# Patient Record
Sex: FEMALE | Race: WHITE | Employment: FULL TIME | URBAN - METROPOLITAN AREA
[De-identification: names, ages, dates, MRNs, and addresses within clinical notes are randomized per-mention and may not be internally consistent; named-entity substitution may affect disease eponyms.]

---

## 2017-02-15 ENCOUNTER — HOSPITAL ENCOUNTER (EMERGENCY)
Age: 32
Discharge: HOME | End: 2017-02-15
Attending: EMERGENCY MEDICINE
Payer: SELF-PAY

## 2017-02-15 VITALS
WEIGHT: 220 LBS | SYSTOLIC BLOOD PRESSURE: 179 MMHG | RESPIRATION RATE: 20 BRPM | DIASTOLIC BLOOD PRESSURE: 100 MMHG | HEART RATE: 108 BPM | OXYGEN SATURATION: 98 % | HEIGHT: 69 IN | TEMPERATURE: 98.9 F | BODY MASS INDEX: 32.58 KG/M2

## 2017-02-15 DIAGNOSIS — N39.0 URINARY TRACT INFECTION WITHOUT HEMATURIA, SITE UNSPECIFIED: ICD-10-CM

## 2017-02-15 DIAGNOSIS — Z32.01 PREGNANCY TEST PERFORMED, PREGNANCY CONFIRMED: Primary | ICD-10-CM

## 2017-02-15 LAB
ALBUMIN SERPL BCP-MCNC: 4.1 G/DL (ref 3.5–5)
ALBUMIN/GLOB SERPL: 1.1 {RATIO} (ref 1.2–3.5)
ALP SERPL-CCNC: 84 U/L (ref 50–136)
ALT SERPL-CCNC: 28 U/L (ref 12–65)
AMORPH CRY URNS QL MICRO: ABNORMAL
ANION GAP BLD CALC-SCNC: 8 MMOL/L (ref 7–16)
AST SERPL W P-5'-P-CCNC: 12 U/L (ref 15–37)
BACTERIA URNS QL MICRO: ABNORMAL /HPF
BASOPHILS # BLD AUTO: 0 K/UL (ref 0–0.2)
BASOPHILS # BLD: 0 % (ref 0–2)
BILIRUB SERPL-MCNC: 0.4 MG/DL (ref 0.2–1.1)
BUN SERPL-MCNC: 5 MG/DL (ref 6–23)
CALCIUM SERPL-MCNC: 9.3 MG/DL (ref 8.3–10.4)
CASTS URNS QL MICRO: 0 /LPF
CHLORIDE SERPL-SCNC: 110 MMOL/L (ref 98–107)
CO2 SERPL-SCNC: 24 MMOL/L (ref 21–32)
CREAT SERPL-MCNC: 0.82 MG/DL (ref 0.6–1)
CRYSTALS URNS QL MICRO: 0 /LPF
DIFFERENTIAL METHOD BLD: ABNORMAL
EOSINOPHIL # BLD: 0 K/UL (ref 0–0.8)
EOSINOPHIL NFR BLD: 0 % (ref 0.5–7.8)
EPI CELLS #/AREA URNS HPF: ABNORMAL /HPF
ERYTHROCYTE [DISTWIDTH] IN BLOOD BY AUTOMATED COUNT: 12.2 % (ref 11.9–14.6)
GLOBULIN SER CALC-MCNC: 3.8 G/DL (ref 2.3–3.5)
GLUCOSE SERPL-MCNC: 141 MG/DL (ref 65–100)
HCG SERPL-ACNC: 98 MIU/ML (ref 0–6)
HCG UR QL: POSITIVE
HCT VFR BLD AUTO: 41.8 % (ref 35.8–46.3)
HGB BLD-MCNC: 13.6 G/DL (ref 11.7–15.4)
IMM GRANULOCYTES # BLD: 0 K/UL (ref 0–0.5)
IMM GRANULOCYTES NFR BLD AUTO: 0.1 % (ref 0–5)
LIPASE SERPL-CCNC: 115 U/L (ref 73–393)
LYMPHOCYTES # BLD AUTO: 20 % (ref 13–44)
LYMPHOCYTES # BLD: 1.6 K/UL (ref 0.5–4.6)
MCH RBC QN AUTO: 29.9 PG (ref 26.1–32.9)
MCHC RBC AUTO-ENTMCNC: 32.5 G/DL (ref 31.4–35)
MCV RBC AUTO: 91.9 FL (ref 79.6–97.8)
MONOCYTES # BLD: 0.2 K/UL (ref 0.1–1.3)
MONOCYTES NFR BLD AUTO: 2 % (ref 4–12)
MUCOUS THREADS URNS QL MICRO: ABNORMAL /LPF
NEUTS SEG # BLD: 6.1 K/UL (ref 1.7–8.2)
NEUTS SEG NFR BLD AUTO: 78 % (ref 43–78)
OTHER OBSERVATIONS,UCOM: ABNORMAL
PLATELET # BLD AUTO: 300 K/UL (ref 150–450)
PMV BLD AUTO: 11.1 FL (ref 10.8–14.1)
POTASSIUM SERPL-SCNC: 3.7 MMOL/L (ref 3.5–5.1)
PROT SERPL-MCNC: 7.9 G/DL (ref 6.3–8.2)
RBC # BLD AUTO: 4.55 M/UL (ref 4.05–5.25)
RBC #/AREA URNS HPF: ABNORMAL /HPF
SODIUM SERPL-SCNC: 142 MMOL/L (ref 136–145)
WBC # BLD AUTO: 7.9 K/UL (ref 4.3–11.1)
WBC URNS QL MICRO: ABNORMAL /HPF

## 2017-02-15 PROCEDURE — 99283 EMERGENCY DEPT VISIT LOW MDM: CPT | Performed by: EMERGENCY MEDICINE

## 2017-02-15 PROCEDURE — 81015 MICROSCOPIC EXAM OF URINE: CPT | Performed by: EMERGENCY MEDICINE

## 2017-02-15 PROCEDURE — 81025 URINE PREGNANCY TEST: CPT

## 2017-02-15 PROCEDURE — 83690 ASSAY OF LIPASE: CPT | Performed by: EMERGENCY MEDICINE

## 2017-02-15 PROCEDURE — 80053 COMPREHEN METABOLIC PANEL: CPT | Performed by: EMERGENCY MEDICINE

## 2017-02-15 PROCEDURE — 81003 URINALYSIS AUTO W/O SCOPE: CPT | Performed by: EMERGENCY MEDICINE

## 2017-02-15 PROCEDURE — 84702 CHORIONIC GONADOTROPIN TEST: CPT | Performed by: EMERGENCY MEDICINE

## 2017-02-15 PROCEDURE — 85025 COMPLETE CBC W/AUTO DIFF WBC: CPT | Performed by: EMERGENCY MEDICINE

## 2017-02-15 RX ORDER — CEPHALEXIN 500 MG/1
500 CAPSULE ORAL 4 TIMES DAILY
Qty: 28 CAP | Refills: 0 | Status: SHIPPED | OUTPATIENT
Start: 2017-02-15 | End: 2017-02-22

## 2017-02-15 NOTE — DISCHARGE INSTRUCTIONS
Urinary Tract Infection in Pregnancy: Care Instructions  Your Care Instructions    A urinary tract infection, or UTI, is an infection of the bladder and other urinary structures. Most UTIs occur in the bladder. They often cause pain or burning when you urinate. UTI is the most common bacterial infection in pregnancy. If untreated, a UTI could lead to problems such as a kidney infection or  labor. Most UTIs can be cured with antibiotics. Your doctor will prescribe an antibiotic that is safe during pregnancy. Be sure to finish your medicine so that the infection does not spread to your kidneys. Follow-up care is a key part of your treatment and safety. Be sure to make and go to all appointments, and call your doctor if you are having problems. It's also a good idea to know your test results and keep a list of the medicines you take. How can you care for yourself at home? · Take your antibiotics as directed. Do not stop taking them just because you feel better. You need to take the full course of antibiotics. · Drink extra water and other fluids for the next day or two. This will help wash out the bacteria causing the infection. If you have kidney, heart, or liver disease and have to limit fluids, talk with your doctor before you increase the amount of fluids you drink. · Drink cranberry juice to help fight bacteria in the bladder. · Do not drink alcohol. · Urinate often. Try to empty your bladder each time. Preventing UTIs  · Drink plenty of fluids, enough so that your urine is light yellow or clear like water. This helps you urinate often, which clears bacteria from your system. If you have kidney, heart, or liver disease and have to limit fluids, talk with your doctor before you increase the amount of fluids you drink. · Drink cranberry juice. · Urinate when you first have the urge. · Urinate right after you have sex. This is the best way for women to avoid UTIs.   · When going to the bathroom, wipe from front to back to keep bacteria from entering the vagina or urethra. When should you call for help? Call your doctor now or seek immediate medical care if:  · Symptoms such as fever, chills, nausea, or vomiting get worse or appear for the first time. · You have new pain in your back just below your rib cage. This is called flank pain. · There is new blood or pus in your urine. · You have any problems with your antibiotic medicine. Watch closely for changes in your health, and be sure to contact your doctor if:  · You are not getting better after 1 day (24 hours). · You have new symptoms, such as blood in your urine. Where can you learn more? Go to http://alida-ed.info/. Enter M982 in the search box to learn more about \"Urinary Tract Infection in Pregnancy: Care Instructions. \"  Current as of: June 8, 2016  Content Version: 11.1  © 8353-8754 [employer] Care instructions adapted under license by [employer] (which disclaims liability or warranty for this information). If you have questions about a medical condition or this instruction, always ask your healthcare professional. [place] [address] any warranty or liability for your use of this information. Urinary Tract Infection in Women: Care Instructions  Your Care Instructions    A urinary tract infection, or UTI, is a general term for an infection anywhere between the kidneys and the urethra (where urine comes out). Most UTIs are bladder infections. They often cause pain or burning when you urinate. UTIs are caused by bacteria and can be cured with antibiotics. Be sure to complete your treatment so that the infection goes away. Follow-up care is a key part of your treatment and safety. Be sure to make and go to all appointments, and call your doctor if you are having problems.  It's also a good idea to know your test results and keep a list of the medicines you take.  How can you care for yourself at home? · Take your antibiotics as directed. Do not stop taking them just because you feel better. You need to take the full course of antibiotics. · Drink extra water and other fluids for the next day or two. This may help wash out the bacteria that are causing the infection. (If you have kidney, heart, or liver disease and have to limit fluids, talk with your doctor before you increase your fluid intake.)  · Avoid drinks that are carbonated or have caffeine. They can irritate the bladder. · Urinate often. Try to empty your bladder each time. · To relieve pain, take a hot bath or lay a heating pad set on low over your lower belly or genital area. Never go to sleep with a heating pad in place. To prevent UTIs  · Drink plenty of water each day. This helps you urinate often, which clears bacteria from your system. (If you have kidney, heart, or liver disease and have to limit fluids, talk with your doctor before you increase your fluid intake.)  · Consider adding cranberry juice to your diet. · Urinate when you need to. · Urinate right after you have sex. · Change sanitary pads often. · Avoid douches, bubble baths, feminine hygiene sprays, and other feminine hygiene products that have deodorants. · After going to the bathroom, wipe from front to back. When should you call for help? Call your doctor now or seek immediate medical care if:  · Symptoms such as fever, chills, nausea, or vomiting get worse or appear for the first time. · You have new pain in your back just below your rib cage. This is called flank pain. · There is new blood or pus in your urine. · You have any problems with your antibiotic medicine. Watch closely for changes in your health, and be sure to contact your doctor if:  · You are not getting better after taking an antibiotic for 2 days. · Your symptoms go away but then come back. Where can you learn more?   Go to http://alida-ed.info/. Enter Z112 in the search box to learn more about \"Urinary Tract Infection in Women: Care Instructions. \"  Current as of: August 12, 2016  Content Version: 11.1  © 5097-4908 [employer]. Care instructions adapted under license by [employer] (which disclaims liability or warranty for this information). If you have questions about a medical condition or this instruction, always ask your healthcare professional. [address] any warranty or liability for your use of this information.

## 2017-02-15 NOTE — ED PROVIDER NOTES
HPI Comments: Patient is a 76-year-old female who is concerned she might have pancreatitis. She has a friend who has pancreatitis. She is presenting with mild left upper quadrant pain for several weeks. She reports some nausea but no significant vomiting. She also mentions she has some pain when she urinates. She denies any significant vaginal bleeding. States she had chlamydia many years ago. She has  twins at home but has not been trying to get pregnant. Patient is a 34 y.o. female presenting with abdominal pain. The history is provided by the patient. No  was used. Abdominal Pain    Associated symptoms include nausea. Pertinent negatives include no fever, no diarrhea, no vomiting, no dysuria, no headaches and no back pain. Past Medical History:   Diagnosis Date    Chronic kidney disease      kidney stones    Other ill-defined conditions(799.89)      Yearly sinus       Past Surgical History:   Procedure Laterality Date    Hx gyn      Hx heent       tonsils         No family history on file. Social History     Social History    Marital status: SINGLE     Spouse name: N/A    Number of children: N/A    Years of education: N/A     Occupational History    Not on file. Social History Main Topics    Smoking status: Current Every Day Smoker     Packs/day: 0.50    Smokeless tobacco: Not on file    Alcohol use Yes      Comment: occ    Drug use: No    Sexual activity: Yes     Partners: Male     Birth control/ protection: Condom      Comment: lmp- 2 weeks ago     Other Topics Concern    Not on file     Social History Narrative         ALLERGIES: Pcn [penicillins]    Review of Systems   Constitutional: Negative for fatigue and fever. HENT: Negative for sore throat. Eyes: Negative for pain. Respiratory: Negative for cough, chest tightness and shortness of breath. Cardiovascular: Negative for leg swelling.    Gastrointestinal: Positive for abdominal pain and nausea. Negative for diarrhea and vomiting. Genitourinary: Negative for dysuria. Musculoskeletal: Negative for back pain. Neurological: Negative for syncope and headaches. Vitals:    02/15/17 1202   BP: (!) 179/100   Pulse: (!) 108   Resp: 20   Temp: 98.9 °F (37.2 °C)   SpO2: 98%   Weight: 99.8 kg (220 lb)   Height: 5' 9\" (1.753 m)            Physical Exam   Constitutional: She is oriented to person, place, and time. She appears well-developed and well-nourished. No distress. HENT:   Head: Normocephalic and atraumatic. Eyes: Conjunctivae and EOM are normal. Pupils are equal, round, and reactive to light. Neck: Normal range of motion. Neck supple. Cardiovascular: Normal rate, regular rhythm and normal heart sounds. Pulmonary/Chest: Effort normal and breath sounds normal. No respiratory distress. She has no wheezes. She has no rales. Abdominal: Soft. She exhibits no distension. There is no tenderness. There is no rebound. Patient has no pain to palpation. She says most of her pain is in the far left upper quadrant. No guarding. No rebound. Overall nonsurgical abdomen. Musculoskeletal: Normal range of motion. She exhibits no edema or tenderness. Neurological: She is alert and oriented to person, place, and time. Skin: Skin is warm and dry. No rash noted. She is not diaphoretic. Psychiatric: She has a normal mood and affect. Her behavior is normal.   Vitals reviewed. MDM  Number of Diagnoses or Management Options  Pregnancy test performed, pregnancy confirmed: new and does not require workup  Urinary tract infection without hematuria, site unspecified: new and does not require workup  Diagnosis management comments: I informed the patient that her urine demonstrated she is pregnant. We'll add a Quant. Will possibly need an ultrasound depending on Quant level. Quant extremely low. Ultrasound will be low yield at this time.   Given that the patient does not have pain in her adnexal region nor vaginal bleeding and low suspicion for possible ectopic. Patient also states that her left upper quadrant pain have been going on for several weeks before when she believes she got pregnant. I informed her to return to the emergency department if she expresses any increase in pain, bleeding or fevers. She expresses understanding. Strongly encourage her to follow up with her primary care provider. Obstetrician in the near future. Patient discharged on Keflex for urinary tract infection.        Amount and/or Complexity of Data Reviewed  Clinical lab tests: ordered and reviewed (Results for orders placed or performed during the hospital encounter of 02/15/17  -CBC WITH AUTOMATED DIFF       Result                                            Value                         Ref Range                       WBC                                               7.9                           4.3 - 11.1 K/uL                 RBC                                               4.55                          4.05 - 5.25 M/uL                HGB                                               13.6                          11.7 - 15.4 g/dL                HCT                                               41.8                          35.8 - 46.3 %                   MCV                                               91.9                          79.6 - 97.8 FL                  MCH                                               29.9                          26.1 - 32.9 PG                  MCHC                                              32.5                          31.4 - 35.0 g/dL                RDW                                               12.2                          11.9 - 14.6 %                   PLATELET                                          300                           150 - 450 K/uL                  MPV                                               11.1                          10.8 - 14.1 FL DF                                                AUTOMATED                                                     NEUTROPHILS                                       78                            43 - 78 %                       LYMPHOCYTES                                       20                            13 - 44 %                       MONOCYTES                                         2 (L)                         4.0 - 12.0 %                    EOSINOPHILS                                       0 (L)                         0.5 - 7.8 %                     BASOPHILS                                         0                             0.0 - 2.0 %                     IMMATURE GRANULOCYTES                             0.1                           0.0 - 5.0 %                     ABS. NEUTROPHILS                                  6.1                           1.7 - 8.2 K/UL                  ABS. LYMPHOCYTES                                  1.6                           0.5 - 4.6 K/UL                  ABS. MONOCYTES                                    0.2                           0.1 - 1.3 K/UL                  ABS. EOSINOPHILS                                  0.0                           0.0 - 0.8 K/UL                  ABS. BASOPHILS                                    0.0                           0.0 - 0.2 K/UL                  ABS. IMM.  GRANS.                                  0.0                           0.0 - 0.5 K/UL             -METABOLIC PANEL, COMPREHENSIVE       Result                                            Value                         Ref Range                       Sodium                                            142                           136 - 145 mmol/L                Potassium                                         3.7                           3.5 - 5.1 mmol/L                Chloride                                          110 (H)                       98 - 107 mmol/L                 CO2 24                            21 - 32 mmol/L                  Anion gap                                         8                             7 - 16 mmol/L                   Glucose                                           141 (H)                       65 - 100 mg/dL                  BUN                                               5 (L)                         6 - 23 MG/DL                    Creatinine                                        0.82                          0.6 - 1.0 MG/DL                 GFR est AA                                        >60                           >60 ml/min/1.73m2               GFR est non-AA                                    >60                           >60 ml/min/1.73m2               Calcium                                           9.3                           8.3 - 10.4 MG/DL                Bilirubin, total                                  0.4                           0.2 - 1.1 MG/DL                 ALT (SGPT)                                        28                            12 - 65 U/L                     AST (SGOT)                                        12 (L)                        15 - 37 U/L                     Alk. phosphatase                                  84                            50 - 136 U/L                    Protein, total                                    7.9                           6.3 - 8.2 g/dL                  Albumin                                           4.1                           3.5 - 5.0 g/dL                  Globulin                                          3.8 (H)                       2.3 - 3.5 g/dL                  A-G Ratio                                         1.1 (L)                       1.2 - 3.5                  -LIPASE       Result                                            Value                         Ref Range                       Lipase 115                           73 - 393 U/L               -URINE MICROSCOPIC       Result                                            Value                         Ref Range                       WBC                                               3-5                           0 /hpf                          RBC                                               5-10                          0 /hpf                          Epithelial cells                                  5-10                          0 /hpf                          Bacteria                                          1+ (H)                        0 /hpf                          Casts                                             0                             0 /lpf                          Crystals                                          0                             0 /LPF                          Amorphous Crystals                                TRACE                         0                               Mucus                                             2+ (H)                        0 /lpf                          Other observations                                RESULTS VERIFIED MANUALLY                                -TOTAL HCG, QT.        Result                                            Value                         Ref Range                       Beta HCG, QT                                      98 (H)                        0.0 - 6.0 MIU/ML           -HCG URINE, QL. - POC       Result                                            Value                         Ref Range                       Pregnancy test,urine (POC)                        POSITIVE (A)                  NEG                        )  Review and summarize past medical records: yes  Independent visualization of images, tracings, or specimens: yes    Risk of Complications, Morbidity, and/or Mortality  Presenting problems: high  Diagnostic procedures: moderate  Management options: moderate    Patient Progress  Patient progress: stable    ED Course       Procedures

## 2020-05-24 ENCOUNTER — HOSPITAL ENCOUNTER (EMERGENCY)
Age: 35
Discharge: HOME | End: 2020-05-24
Attending: EMERGENCY MEDICINE
Payer: SELF-PAY

## 2020-05-24 ENCOUNTER — APPOINTMENT (OUTPATIENT)
Dept: CT IMAGING | Age: 35
End: 2020-05-24
Attending: EMERGENCY MEDICINE
Payer: SELF-PAY

## 2020-05-24 VITALS
BODY MASS INDEX: 34.07 KG/M2 | RESPIRATION RATE: 16 BRPM | TEMPERATURE: 98.3 F | DIASTOLIC BLOOD PRESSURE: 78 MMHG | HEART RATE: 76 BPM | WEIGHT: 230 LBS | HEIGHT: 69 IN | OXYGEN SATURATION: 98 % | SYSTOLIC BLOOD PRESSURE: 132 MMHG

## 2020-05-24 DIAGNOSIS — R31.0 GROSS HEMATURIA: ICD-10-CM

## 2020-05-24 DIAGNOSIS — N20.0 KIDNEY STONES: Primary | ICD-10-CM

## 2020-05-24 LAB
ALBUMIN SERPL-MCNC: 4.3 G/DL (ref 3.5–5)
ALBUMIN/GLOB SERPL: 1.1 {RATIO} (ref 1.2–3.5)
ALP SERPL-CCNC: 74 U/L (ref 50–136)
ALT SERPL-CCNC: 20 U/L (ref 12–65)
ANION GAP SERPL CALC-SCNC: 5 MMOL/L (ref 7–16)
AST SERPL-CCNC: 14 U/L (ref 15–37)
BACTERIA URNS QL MICRO: 0 /HPF
BASOPHILS # BLD: 0.1 K/UL (ref 0–0.2)
BASOPHILS NFR BLD: 1 % (ref 0–2)
BILIRUB SERPL-MCNC: 0.3 MG/DL (ref 0.2–1.1)
BUN SERPL-MCNC: 12 MG/DL (ref 6–23)
CALCIUM SERPL-MCNC: 9.5 MG/DL (ref 8.3–10.4)
CASTS URNS QL MICRO: ABNORMAL /LPF
CHLORIDE SERPL-SCNC: 107 MMOL/L (ref 98–107)
CO2 SERPL-SCNC: 29 MMOL/L (ref 21–32)
CREAT SERPL-MCNC: 0.66 MG/DL (ref 0.6–1)
DIFFERENTIAL METHOD BLD: NORMAL
EOSINOPHIL # BLD: 0.2 K/UL (ref 0–0.8)
EOSINOPHIL NFR BLD: 2 % (ref 0.5–7.8)
EPI CELLS #/AREA URNS HPF: ABNORMAL /HPF
ERYTHROCYTE [DISTWIDTH] IN BLOOD BY AUTOMATED COUNT: 12.5 % (ref 11.9–14.6)
GLOBULIN SER CALC-MCNC: 3.8 G/DL (ref 2.3–3.5)
GLUCOSE SERPL-MCNC: 88 MG/DL (ref 65–100)
HCG UR QL: NEGATIVE
HCT VFR BLD AUTO: 42.9 % (ref 35.8–46.3)
HGB BLD-MCNC: 13.7 G/DL (ref 11.7–15.4)
IMM GRANULOCYTES # BLD AUTO: 0 K/UL (ref 0–0.5)
IMM GRANULOCYTES NFR BLD AUTO: 0 % (ref 0–5)
LYMPHOCYTES # BLD: 3.5 K/UL (ref 0.5–4.6)
LYMPHOCYTES NFR BLD: 42 % (ref 13–44)
MCH RBC QN AUTO: 29.8 PG (ref 26.1–32.9)
MCHC RBC AUTO-ENTMCNC: 31.9 G/DL (ref 31.4–35)
MCV RBC AUTO: 93.5 FL (ref 79.6–97.8)
MONOCYTES # BLD: 0.5 K/UL (ref 0.1–1.3)
MONOCYTES NFR BLD: 6 % (ref 4–12)
NEUTS SEG # BLD: 4.2 K/UL (ref 1.7–8.2)
NEUTS SEG NFR BLD: 49 % (ref 43–78)
NRBC # BLD: 0 K/UL (ref 0–0.2)
PLATELET # BLD AUTO: 344 K/UL (ref 150–450)
PMV BLD AUTO: 10.8 FL (ref 9.4–12.3)
POTASSIUM SERPL-SCNC: 4.1 MMOL/L (ref 3.5–5.1)
PROT SERPL-MCNC: 8.1 G/DL (ref 6.3–8.2)
RBC # BLD AUTO: 4.59 M/UL (ref 4.05–5.2)
RBC #/AREA URNS HPF: >100 /HPF
SODIUM SERPL-SCNC: 141 MMOL/L (ref 136–145)
WBC # BLD AUTO: 8.4 K/UL (ref 4.3–11.1)
WBC URNS QL MICRO: ABNORMAL /HPF

## 2020-05-24 PROCEDURE — 81003 URINALYSIS AUTO W/O SCOPE: CPT

## 2020-05-24 PROCEDURE — 99284 EMERGENCY DEPT VISIT MOD MDM: CPT

## 2020-05-24 PROCEDURE — 81015 MICROSCOPIC EXAM OF URINE: CPT

## 2020-05-24 PROCEDURE — 74011250636 HC RX REV CODE- 250/636: Performed by: EMERGENCY MEDICINE

## 2020-05-24 PROCEDURE — 80053 COMPREHEN METABOLIC PANEL: CPT

## 2020-05-24 PROCEDURE — 74176 CT ABD & PELVIS W/O CONTRAST: CPT

## 2020-05-24 PROCEDURE — 85025 COMPLETE CBC W/AUTO DIFF WBC: CPT

## 2020-05-24 PROCEDURE — 96374 THER/PROPH/DIAG INJ IV PUSH: CPT

## 2020-05-24 PROCEDURE — 81025 URINE PREGNANCY TEST: CPT

## 2020-05-24 RX ORDER — MORPHINE SULFATE 4 MG/ML
4 INJECTION INTRAVENOUS
Status: DISCONTINUED | OUTPATIENT
Start: 2020-05-24 | End: 2020-05-24 | Stop reason: HOSPADM

## 2020-05-24 RX ORDER — HYDROCODONE BITARTRATE AND ACETAMINOPHEN 5; 325 MG/1; MG/1
1 TABLET ORAL
Qty: 12 TAB | Refills: 0 | Status: SHIPPED | OUTPATIENT
Start: 2020-05-24 | End: 2020-05-27

## 2020-05-24 RX ORDER — CIPROFLOXACIN 500 MG/1
500 TABLET ORAL 2 TIMES DAILY
Qty: 14 TAB | Refills: 0 | Status: SHIPPED | OUTPATIENT
Start: 2020-05-24 | End: 2020-05-31

## 2020-05-24 RX ORDER — KETOROLAC TROMETHAMINE 30 MG/ML
30 INJECTION, SOLUTION INTRAMUSCULAR; INTRAVENOUS
Status: COMPLETED | OUTPATIENT
Start: 2020-05-24 | End: 2020-05-24

## 2020-05-24 RX ADMIN — KETOROLAC TROMETHAMINE 30 MG: 30 INJECTION, SOLUTION INTRAMUSCULAR at 11:33

## 2020-05-24 NOTE — DISCHARGE INSTRUCTIONS
Patient Education        Blood in the Urine: Care Instructions  Your Care Instructions    Blood in the urine, or hematuria, may make the urine look red, brown, or pink. There may be blood every time you urinate or just from time to time. You cannot always see blood in the urine, but it will show up in a urine test.  Blood in the urine may be serious. It should always be checked by a doctor. Your doctor may recommend more tests, including an X-ray, a CT scan, or a cystoscopy (which lets a doctor look inside the urethra and bladder). Blood in the urine can be a sign of another problem. Common causes are bladder infections and kidney stones. An injury to your groin or your genital area can also cause bleeding in the urinary tract. Very hard exercise--such as running a marathon--can cause blood in the urine. Blood in the urine can also be a sign of kidney disease or cancer in the bladder or kidney. Many cases of blood in the urine are caused by a harmless condition that runs in families. This is called benign familial hematuria. It does not need any treatment. Sometimes your urine may look red or brown even though it does not contain blood. For example, not getting enough fluids (dehydration), taking certain medicines, or having a liver problem can change the color of your urine. Eating foods such as beets, rhubarb, or blackberries or foods with red food coloring can make your urine look red or pink. Follow-up care is a key part of your treatment and safety. Be sure to make and go to all appointments, and call your doctor if you are having problems. It's also a good idea to know your test results and keep a list of the medicines you take. When should you call for help? Call your doctor now or seek immediate medical care if:    · You have symptoms of a urinary infection. For example:  ? You have pus in your urine. ? You have pain in your back just below your rib cage. This is called flank pain. ?  You have a fever, chills, or body aches. ? It hurts to urinate. ? You have groin or belly pain.     · You have more blood in your urine.    Watch closely for changes in your health, and be sure to contact your doctor if:    · You have new urination problems.     · You do not get better as expected. Where can you learn more? Go to http://alida-ed.info/  Enter V741 in the search box to learn more about \"Blood in the Urine: Care Instructions. \"  Current as of: August 21, 2019Content Version: 12.4  © 8113-1607 [employer]. Care instructions adapted under license by [employer] (which disclaims liability or warranty for this information). If you have questions about a medical condition or this instruction, always ask your healthcare professional. [address] any warranty or liability for your use of this information.

## 2020-05-24 NOTE — LETTER
[facility] EMERGENCY DEPT 
[address] [facility] [address] [facility] [address] 
[phone] Work/School Note Date: 5/24/2020 To Whom It May concern: 
 
Braulio Mahan was seen and treated today in the emergency room by the following provider(s): 
Attending Provider: Shin Chicas MD. Braulio Mahan may return to work on 5/26/2020.  
 
Sincerely,

## 2020-05-24 NOTE — ED NOTES
I have reviewed discharge instructions with the patient. The spouse verbalized understanding. Patient left ED via Discharge Method: ambulatory to Home with self. Opportunity for questions and clarification provided. Patient given 2 scripts. To continue your aftercare when you leave the hospital, you may receive an automated call from our care team to check in on how you are doing. This is a free service and part of our promise to provide the best care and service to meet your aftercare needs.  If you have questions, or wish to unsubscribe from this service please call [phone]. Thank you for Choosing our [facility] Emergency Department.

## 2020-05-24 NOTE — ED PROVIDER NOTES
Presents with complaint of bilateral flank pain and hematuria. Patient states she has a history of kidney stones and usually just hydrates and it passes but she has had a lot of cramping pain and pressure in her pelvis. She has been passing a lot of blood this time. Started 2 days ago. Her last period was about 5 days ago. Does not see a urologist.    The history is provided by the patient. Blood in Urine    This is a new problem. The current episode started 2 days ago. The problem occurs every urination. The problem has not changed since onset. The quality of the pain is described as burning, stabbing and shooting. The pain is severe. There has been no fever. Associated symptoms include hematuria and abdominal pain. Pertinent negatives include no chills. The patient is not pregnant. She has tried nothing for the symptoms. Her past medical history is significant for kidney stones. Past Medical History:   Diagnosis Date    Chronic kidney disease     kidney stones    Other ill-defined conditions(799.89)     Yearly sinus       Past Surgical History:   Procedure Laterality Date    HX GYN      HX HEENT      tonsils         No family history on file.     Social History     Socioeconomic History    Marital status: SINGLE     Spouse name: Not on file    Number of children: Not on file    Years of education: Not on file    Highest education level: Not on file   Occupational History    Not on file   Social Needs    Financial resource strain: Not on file    Food insecurity     Worry: Not on file     Inability: Not on file    Transportation needs     Medical: Not on file     Non-medical: Not on file   Tobacco Use    Smoking status: Current Every Day Smoker     Packs/day: 0.50   Substance and Sexual Activity    Alcohol use: Yes     Comment: occ    Drug use: No    Sexual activity: Yes     Partners: Male     Birth control/protection: Condom     Comment: lmp- 2 weeks ago   Lifestyle    Physical activity Days per week: Not on file     Minutes per session: Not on file    Stress: Not on file   Relationships    Social connections     Talks on phone: Not on file     Gets together: Not on file     Attends Advent service: Not on file     Active member of club or organization: Not on file     Attends meetings of clubs or organizations: Not on file     Relationship status: Not on file    Intimate partner violence     Fear of current or ex partner: Not on file     Emotionally abused: Not on file     Physically abused: Not on file     Forced sexual activity: Not on file   Other Topics Concern    Not on file   Social History Narrative    Not on file         ALLERGIES: Pcn [penicillins]    Review of Systems   Constitutional: Negative for chills and fever. Cardiovascular: Negative for chest pain. Gastrointestinal: Positive for abdominal pain. Genitourinary: Positive for hematuria. Skin: Negative for rash and wound. All other systems reviewed and are negative. Vitals:    05/24/20 1019   BP: 125/81   Pulse: 85   Resp: 18   Temp: 98.3 °F (36.8 °C)   SpO2: 100%   Weight: 104.3 kg (230 lb)   Height: 5' 9\" (1.753 m)            Physical Exam  Vitals signs and nursing note reviewed. Constitutional:       General: She is not in acute distress. Appearance: She is well-developed. She is not diaphoretic. HENT:      Head: Normocephalic and atraumatic. Eyes:      General:         Right eye: No discharge. Left eye: No discharge. Conjunctiva/sclera: Conjunctivae normal.   Neck:      Musculoskeletal: Normal range of motion and neck supple. Cardiovascular:      Rate and Rhythm: Normal rate and regular rhythm. Pulmonary:      Effort: Pulmonary effort is normal. No respiratory distress. Breath sounds: Normal breath sounds. Abdominal:      General: There is no distension. Palpations: Abdomen is soft. Tenderness: There is no abdominal tenderness.    Musculoskeletal: Normal range of motion. General: Tenderness (R>L flank) present. Skin:     General: Skin is warm and dry. Capillary Refill: Capillary refill takes less than 2 seconds. Neurological:      Mental Status: She is alert and oriented to person, place, and time. Cranial Nerves: No cranial nerve deficit. Psychiatric:         Behavior: Behavior normal.          MDM  Number of Diagnoses or Management Options  Gross hematuria:   Kidney stones:   Diagnosis management comments: B stones without obstruction.   Will tx with abx and refer to urology       Amount and/or Complexity of Data Reviewed  Clinical lab tests: reviewed and ordered    Risk of Complications, Morbidity, and/or Mortality  Presenting problems: high  Diagnostic procedures: moderate  Management options: moderate    Patient Progress  Patient progress: improved         Procedures

## 2020-07-06 ENCOUNTER — APPOINTMENT (OUTPATIENT)
Dept: GENERAL RADIOLOGY | Age: 35
End: 2020-07-06
Payer: SELF-PAY

## 2020-07-06 ENCOUNTER — HOSPITAL ENCOUNTER (EMERGENCY)
Age: 35
Discharge: HOME | End: 2020-07-06
Payer: SELF-PAY

## 2020-07-06 VITALS
DIASTOLIC BLOOD PRESSURE: 69 MMHG | WEIGHT: 220 LBS | SYSTOLIC BLOOD PRESSURE: 120 MMHG | HEART RATE: 56 BPM | TEMPERATURE: 98.2 F | BODY MASS INDEX: 31.5 KG/M2 | OXYGEN SATURATION: 99 % | HEIGHT: 70 IN | RESPIRATION RATE: 68 BRPM

## 2020-07-06 DIAGNOSIS — R00.2 PALPITATIONS: Primary | ICD-10-CM

## 2020-07-06 LAB
ALBUMIN SERPL-MCNC: 3.8 G/DL (ref 3.5–5)
ALBUMIN/GLOB SERPL: 1.1 {RATIO} (ref 1.2–3.5)
ALP SERPL-CCNC: 69 U/L (ref 50–136)
ALT SERPL-CCNC: 19 U/L (ref 12–65)
ANION GAP SERPL CALC-SCNC: 5 MMOL/L (ref 7–16)
AST SERPL-CCNC: 10 U/L (ref 15–37)
ATRIAL RATE: 56 BPM
ATRIAL RATE: 64 BPM
BASOPHILS # BLD: 0.1 K/UL (ref 0–0.2)
BASOPHILS NFR BLD: 1 % (ref 0–2)
BILIRUB SERPL-MCNC: 0.1 MG/DL (ref 0.2–1.1)
BUN SERPL-MCNC: 14 MG/DL (ref 6–23)
CALCIUM SERPL-MCNC: 9.2 MG/DL (ref 8.3–10.4)
CALCULATED P AXIS, ECG09: 39 DEGREES
CALCULATED P AXIS, ECG09: 49 DEGREES
CALCULATED R AXIS, ECG10: 32 DEGREES
CALCULATED R AXIS, ECG10: 44 DEGREES
CALCULATED T AXIS, ECG11: 36 DEGREES
CALCULATED T AXIS, ECG11: 43 DEGREES
CHLORIDE SERPL-SCNC: 105 MMOL/L (ref 98–107)
CO2 SERPL-SCNC: 29 MMOL/L (ref 21–32)
CREAT SERPL-MCNC: 0.6 MG/DL (ref 0.6–1)
D DIMER PPP FEU-MCNC: <0.27 UG/ML(FEU)
DIAGNOSIS, 93000: NORMAL
DIAGNOSIS, 93000: NORMAL
DIFFERENTIAL METHOD BLD: ABNORMAL
EOSINOPHIL # BLD: 0.1 K/UL (ref 0–0.8)
EOSINOPHIL NFR BLD: 2 % (ref 0.5–7.8)
ERYTHROCYTE [DISTWIDTH] IN BLOOD BY AUTOMATED COUNT: 12.3 % (ref 11.9–14.6)
GLOBULIN SER CALC-MCNC: 3.4 G/DL (ref 2.3–3.5)
GLUCOSE SERPL-MCNC: 91 MG/DL (ref 65–100)
HCT VFR BLD AUTO: 38.6 % (ref 35.8–46.3)
HGB BLD-MCNC: 12.4 G/DL (ref 11.7–15.4)
IMM GRANULOCYTES # BLD AUTO: 0 K/UL (ref 0–0.5)
IMM GRANULOCYTES NFR BLD AUTO: 1 % (ref 0–5)
LYMPHOCYTES # BLD: 3.9 K/UL (ref 0.5–4.6)
LYMPHOCYTES NFR BLD: 51 % (ref 13–44)
MCH RBC QN AUTO: 29.9 PG (ref 26.1–32.9)
MCHC RBC AUTO-ENTMCNC: 32.1 G/DL (ref 31.4–35)
MCV RBC AUTO: 93 FL (ref 79.6–97.8)
MONOCYTES # BLD: 0.5 K/UL (ref 0.1–1.3)
MONOCYTES NFR BLD: 7 % (ref 4–12)
NEUTS SEG # BLD: 3.1 K/UL (ref 1.7–8.2)
NEUTS SEG NFR BLD: 40 % (ref 43–78)
NRBC # BLD: 0 K/UL (ref 0–0.2)
P-R INTERVAL, ECG05: 152 MS
P-R INTERVAL, ECG05: 160 MS
PLATELET # BLD AUTO: 263 K/UL (ref 150–450)
PMV BLD AUTO: 11 FL (ref 9.4–12.3)
POTASSIUM SERPL-SCNC: 3.7 MMOL/L (ref 3.5–5.1)
PROT SERPL-MCNC: 7.2 G/DL (ref 6.3–8.2)
Q-T INTERVAL, ECG07: 416 MS
Q-T INTERVAL, ECG07: 436 MS
QRS DURATION, ECG06: 84 MS
QRS DURATION, ECG06: 86 MS
QTC CALCULATION (BEZET), ECG08: 420 MS
QTC CALCULATION (BEZET), ECG08: 429 MS
RBC # BLD AUTO: 4.15 M/UL (ref 4.05–5.2)
SODIUM SERPL-SCNC: 139 MMOL/L (ref 136–145)
TROPONIN-HIGH SENSITIVITY: 5 PG/ML (ref 0–14)
TROPONIN-HIGH SENSITIVITY: 5 PG/ML (ref 0–14)
VENTRICULAR RATE, ECG03: 56 BPM
VENTRICULAR RATE, ECG03: 64 BPM
WBC # BLD AUTO: 7.8 K/UL (ref 4.3–11.1)

## 2020-07-06 PROCEDURE — 84484 ASSAY OF TROPONIN QUANT: CPT

## 2020-07-06 PROCEDURE — 93005 ELECTROCARDIOGRAM TRACING: CPT

## 2020-07-06 PROCEDURE — 85025 COMPLETE CBC W/AUTO DIFF WBC: CPT

## 2020-07-06 PROCEDURE — 74011250636 HC RX REV CODE- 250/636

## 2020-07-06 PROCEDURE — 99285 EMERGENCY DEPT VISIT HI MDM: CPT

## 2020-07-06 PROCEDURE — 80053 COMPREHEN METABOLIC PANEL: CPT

## 2020-07-06 PROCEDURE — 96374 THER/PROPH/DIAG INJ IV PUSH: CPT

## 2020-07-06 PROCEDURE — 85379 FIBRIN DEGRADATION QUANT: CPT

## 2020-07-06 PROCEDURE — 71045 X-RAY EXAM CHEST 1 VIEW: CPT

## 2020-07-06 RX ORDER — KETOROLAC TROMETHAMINE 30 MG/ML
30 INJECTION, SOLUTION INTRAMUSCULAR; INTRAVENOUS
Status: COMPLETED | OUTPATIENT
Start: 2020-07-06 | End: 2020-07-06

## 2020-07-06 RX ADMIN — KETOROLAC TROMETHAMINE 30 MG: 30 INJECTION, SOLUTION INTRAMUSCULAR at 05:30

## 2020-07-06 NOTE — ED TRIAGE NOTES
Patient presents ambulatory to triage with steady gait, mask on, with chief complaint of palpitations and tight chest pain that began last night at 2200. Patient reports she has no hx of cardiac issues, parents have a fib but she has not been dx with this, she does not take blood thinners. Pt describes this chest pain as a tightness that is on the left side of her chest and radiates to her neck. Patient reports she called EMS to her house for this at 2230 but refused transport because she thought she had anxiety, then reports the pain resolved sightly and came back severely.

## 2020-07-06 NOTE — DISCHARGE INSTRUCTIONS

## 2020-07-06 NOTE — ED NOTES
I have reviewed discharge instructions with the patient. The patient verbalized understanding. Patient left ED via Discharge Method: ambulatory to Home with self via taxi cab  Opportunity for questions and clarification provided. Patient given 0 scripts. Pt in no acute distress at time of d/c        To continue your aftercare when you leave the hospital, you may receive an automated call from our care team to check in on how you are doing. This is a free service and part of our promise to provide the best care and service to meet your aftercare needs.  If you have questions, or wish to unsubscribe from this service please call [phone]. Thank you for Choosing our [facility] Emergency Department.

## 2020-07-06 NOTE — ED PROVIDER NOTES
79-year-old female complains of palpitations while at rest.  Patient states that she felt like her heart was beating erratically and fast at times. Palpitations    This is a new problem. The current episode started 1 to 2 hours ago. The problem has been resolved. The problem is associated with nothing. Associated symptoms include chest pain. Chest Pain    Associated symptoms include palpitations. Past Medical History:   Diagnosis Date    Chronic kidney disease     kidney stones    Other ill-defined conditions(799.89)     Yearly sinus       Past Surgical History:   Procedure Laterality Date    HX GYN      HX HEENT      tonsils         No family history on file.     Social History     Socioeconomic History    Marital status: SINGLE     Spouse name: Not on file    Number of children: Not on file    Years of education: Not on file    Highest education level: Not on file   Occupational History    Not on file   Social Needs    Financial resource strain: Not on file    Food insecurity     Worry: Not on file     Inability: Not on file    Transportation needs     Medical: Not on file     Non-medical: Not on file   Tobacco Use    Smoking status: Current Every Day Smoker     Packs/day: 0.50   Substance and Sexual Activity    Alcohol use: Yes     Comment: occ    Drug use: No    Sexual activity: Yes     Partners: Male     Birth control/protection: Condom     Comment: lmp- 2 weeks ago   Lifestyle    Physical activity     Days per week: Not on file     Minutes per session: Not on file    Stress: Not on file   Relationships    Social connections     Talks on phone: Not on file     Gets together: Not on file     Attends Oriental orthodox service: Not on file     Active member of club or organization: Not on file     Attends meetings of clubs or organizations: Not on file     Relationship status: Not on file    Intimate partner violence     Fear of current or ex partner: Not on file     Emotionally abused: Not on file     Physically abused: Not on file     Forced sexual activity: Not on file   Other Topics Concern    Not on file   Social History Narrative    Not on file         ALLERGIES: Pcn [penicillins]    Review of Systems   Constitutional: Negative. Negative for activity change. HENT: Negative. Eyes: Negative. Respiratory: Negative. Cardiovascular: Positive for chest pain and palpitations. Gastrointestinal: Negative. Genitourinary: Negative. Musculoskeletal: Negative. Skin: Negative. Neurological: Negative. Psychiatric/Behavioral: Negative. All other systems reviewed and are negative. Vitals:    07/06/20 0157 07/06/20 0201 07/06/20 0207 07/06/20 0236   BP: 151/88 (!) 149/100  134/86   Pulse: 70 69  63   Resp: 19 22  17   Temp:  98.2 °F (36.8 °C)     SpO2: 99% 100% 98% 100%   Weight:  99.8 kg (220 lb)     Height:  5' 10\" (1.778 m)              Physical Exam  Vitals signs and nursing note reviewed. Constitutional:       General: She is not in acute distress. Appearance: She is well-developed. She is not diaphoretic. HENT:      Head: Normocephalic and atraumatic. Right Ear: External ear normal.      Left Ear: External ear normal.      Nose: Nose normal.      Mouth/Throat:      Pharynx: No oropharyngeal exudate. Eyes:      General: No scleral icterus. Right eye: No discharge. Left eye: No discharge. Conjunctiva/sclera: Conjunctivae normal.      Pupils: Pupils are equal, round, and reactive to light. Neck:      Musculoskeletal: Normal range of motion and neck supple. Vascular: No JVD. Trachea: No tracheal deviation. Cardiovascular:      Rate and Rhythm: Normal rate and regular rhythm. Pulmonary:      Effort: Pulmonary effort is normal. No respiratory distress. Breath sounds: Normal breath sounds. No stridor. No wheezing. Chest:      Chest wall: No tenderness.    Abdominal:      General: Bowel sounds are normal. There is no distension. Palpations: Abdomen is soft. There is no mass. Tenderness: There is no abdominal tenderness. Musculoskeletal: Normal range of motion. General: No tenderness. Skin:     General: Skin is warm and dry. Coloration: Skin is not pale. Findings: No erythema or rash. Neurological:      Mental Status: She is alert and oriented to person, place, and time. Cranial Nerves: No cranial nerve deficit. Psychiatric:         Behavior: Behavior normal.         Thought Content: Thought content normal.          MDM  Number of Diagnoses or Management Options  Palpitations:   Diagnosis management comments: Patient had no further palpitations while emerged from the lab test x-rays were all normal.  A few PACs were noted intermittently no other abnormality seen. Patient does smoke she is encouraged to avoid nicotine was drinking alcohol yesterday which may had had slight cardiac toxicity effect. Refer her to cardiology for possible Holter monitor. Cardiology 400 number was contacted and demographics were relayed urgent follow-up was requested.        Amount and/or Complexity of Data Reviewed  Clinical lab tests: ordered and reviewed  Tests in the radiology section of CPT®: ordered and reviewed  Tests in the medicine section of CPT®: ordered and reviewed    Risk of Complications, Morbidity, and/or Mortality  Presenting problems: high  Diagnostic procedures: high  Management options: high           Procedures

## 2020-07-07 ENCOUNTER — HOSPITAL ENCOUNTER (EMERGENCY)
Age: 35
Discharge: HOME | End: 2020-07-07
Payer: SELF-PAY

## 2020-07-07 VITALS
DIASTOLIC BLOOD PRESSURE: 75 MMHG | HEIGHT: 70 IN | OXYGEN SATURATION: 98 % | BODY MASS INDEX: 31.64 KG/M2 | WEIGHT: 221 LBS | RESPIRATION RATE: 16 BRPM | HEART RATE: 47 BPM | TEMPERATURE: 98.3 F | SYSTOLIC BLOOD PRESSURE: 128 MMHG

## 2020-07-07 DIAGNOSIS — R00.2 PALPITATIONS: Primary | ICD-10-CM

## 2020-07-07 LAB
ANION GAP SERPL CALC-SCNC: 6 MMOL/L (ref 7–16)
ATRIAL RATE: 71 BPM
BUN SERPL-MCNC: 12 MG/DL (ref 6–23)
CALCIUM SERPL-MCNC: 9.3 MG/DL (ref 8.3–10.4)
CALCULATED P AXIS, ECG09: 44 DEGREES
CALCULATED R AXIS, ECG10: 40 DEGREES
CALCULATED T AXIS, ECG11: 43 DEGREES
CHLORIDE SERPL-SCNC: 107 MMOL/L (ref 98–107)
CO2 SERPL-SCNC: 26 MMOL/L (ref 21–32)
CREAT SERPL-MCNC: 0.66 MG/DL (ref 0.6–1)
DIAGNOSIS, 93000: NORMAL
GLUCOSE SERPL-MCNC: 87 MG/DL (ref 65–100)
MAGNESIUM SERPL-MCNC: 2.1 MG/DL (ref 1.8–2.4)
P-R INTERVAL, ECG05: 144 MS
POTASSIUM SERPL-SCNC: 4.3 MMOL/L (ref 3.5–5.1)
Q-T INTERVAL, ECG07: 410 MS
QRS DURATION, ECG06: 82 MS
QTC CALCULATION (BEZET), ECG08: 445 MS
SODIUM SERPL-SCNC: 139 MMOL/L (ref 136–145)
TROPONIN-HIGH SENSITIVITY: 3.9 PG/ML (ref 0–14)
VENTRICULAR RATE, ECG03: 71 BPM

## 2020-07-07 PROCEDURE — 84484 ASSAY OF TROPONIN QUANT: CPT

## 2020-07-07 PROCEDURE — 80048 BASIC METABOLIC PNL TOTAL CA: CPT

## 2020-07-07 PROCEDURE — 93005 ELECTROCARDIOGRAM TRACING: CPT | Performed by: EMERGENCY MEDICINE

## 2020-07-07 PROCEDURE — 83735 ASSAY OF MAGNESIUM: CPT

## 2020-07-07 PROCEDURE — 93005 ELECTROCARDIOGRAM TRACING: CPT

## 2020-07-07 RX ORDER — ALPRAZOLAM 0.5 MG/1
0.5 TABLET ORAL
Qty: 6 TAB | Refills: 0 | Status: SHIPPED | OUTPATIENT
Start: 2020-07-07 | End: 2022-04-06 | Stop reason: ALTCHOICE

## 2020-07-07 NOTE — ED TRIAGE NOTES
Arrives with face mask in place. Arrives from home via [facility] with reports palpitations, chest \"tightness\", nausea, shortness of breath. Reports pain to left side of neck. Onset just pta while sitting watching tv. Seen here last night for same complaint, discharged this AM approx 0700. Referred to cardiology however did not follow up due to lack of insurance. Denies caffeine or smoking today.  Orders received from dr Sarbjit Moreno for ekg

## 2020-07-07 NOTE — DISCHARGE INSTRUCTIONS
Patient Education        Supraventricular Tachycardia: Care Instructions  Your Care Instructions     Having supraventricular tachycardia (SVT) means that from time to time your heart beats abnormally fast. This fast rhythm is caused by changes in the electrical system of your heart. You may feel a fluttering in your chest (palpitations) and have a fast pulse. When your heart is beating fast, you may feel anxious and lightheaded, be short of breath, and feel discomfort in the chest.  Your doctor may prescribe medicines to help slow down your heartbeat. Your doctor may also suggest you try vagal maneuvers when having an episode of SVT. These are things, like bearing down, that might help slow your heart rate. Bearing down means that you try to breathe out with your stomach muscles but you don't let air out of your nose or mouth. Your doctor can show you how to do vagal maneuvers. He or she may suggest you lie down on your back to do them. In some cases, either cardioversion treatment or a procedure called catheter ablation is done to correct SVT. Your doctor may ask you to wear a small electronic device for 1 or 2 days to monitor your heart. It is called a Holter monitor. Follow-up care is a key part of your treatment and safety. Be sure to make and go to all appointments, and call your doctor if you are having problems. It's also a good idea to know your test results and keep a list of the medicines you take. How can you care for yourself at home? · Be safe with medicines. Take your medicines exactly as prescribed. Call your doctor if you think you are having a problem with your medicine. You will get more details on the specific medicines your doctor prescribes. · If your doctor showed you how to do vagal maneuvers, try them when you have an episode. These maneuvers include bearing down or putting an ice-cold, wet towel on your face. · Monitor your condition by keeping a diary of your SVT episodes.  Bring this to your doctor appointments. ? Write down how fast or slow your heart was beating. To count your heart rate:  § Gently place 2 fingers of your hand on the inside of your other wrist, below your thumb. § Count the beats for 30 seconds. § Then, double the result to get the number of beats per minute. ? Write down if your heart rhythm was regular or irregular. ? Write down the symptoms you had.  ? Write down the time of day your symptoms occurred. ? Write down how long your symptoms lasted. ? Write down what you were doing when your symptoms started. ? Write down what may have helped your symptoms go away. · If they trigger episodes, limit or avoid alcohol or drinks with caffeine. · Do not use over-the-counter decongestants, herbal remedies, diet pills, or \"pep\" pills, which often contain stimulants. · Do not use illegal drugs, such as cocaine, ecstasy, or methamphetamine, which can speed up your heart's rhythm. · Do not smoke. Smoking can make this condition worse. If you need help quitting, talk to your doctor about stop-smoking programs and medicines. These can increase your chances of quitting for good. · Be alert for new or worsening symptoms, such as shortness of breath, pounding of your heart, or unusual tiredness. If new symptoms develop or your symptoms become worse, call your doctor. When should you call for help? [id number] anytime you think you may need emergency care. For example, call if:  · You passed out (lost consciousness). · You are short of breath. Call your doctor now or seek immediate medical care if:  · You have a fast heartbeat. · You are dizzy or lightheaded, or feel like you may faint. Watch closely for changes in your health, and be sure to contact your doctor if:  · You do not get better as expected. Where can you learn more?   Go to http://alida-ed.info/  Enter G244 in the search box to learn more about \"Supraventricular Tachycardia: Care Instructions. \"  Current as of: December 16, 2019               Content Version: 12.5  © 0305-3690 [employer]. Care instructions adapted under license by [employer] (which disclaims liability or warranty for this information). If you have questions about a medical condition or this instruction, always ask your healthcare professional. [address] any warranty or liability for your use of this information.

## 2020-07-07 NOTE — ED PROVIDER NOTES
42-year-old female complaint of palpitations. Patient was seen yesterday evening and left earliest yesterday morning less than 24 hours ago for the same complaint. She had a complete work-up including cardiac troponins d-dimer x-rays etc. she was also monitored for many hours on the monitor no abnormal arrhythmias were noted. She was sent home to follow-up with cardiology cardiology contacted her to set up a heart monitor however due to not having insurance she did not want to pay for it herself. She is going today to get Medicaid. The history is provided by the patient. Past Medical History:   Diagnosis Date    Chronic kidney disease     kidney stones    Other ill-defined conditions(799.89)     Yearly sinus       Past Surgical History:   Procedure Laterality Date    HX GYN      HX HEENT      tonsils         No family history on file.     Social History     Socioeconomic History    Marital status: SINGLE     Spouse name: Not on file    Number of children: Not on file    Years of education: Not on file    Highest education level: Not on file   Occupational History    Not on file   Social Needs    Financial resource strain: Not on file    Food insecurity     Worry: Not on file     Inability: Not on file    Transportation needs     Medical: Not on file     Non-medical: Not on file   Tobacco Use    Smoking status: Current Every Day Smoker     Packs/day: 0.50   Substance and Sexual Activity    Alcohol use: Yes     Comment: occ    Drug use: No    Sexual activity: Yes     Partners: Male     Birth control/protection: Condom     Comment: lmp- 2 weeks ago   Lifestyle    Physical activity     Days per week: Not on file     Minutes per session: Not on file    Stress: Not on file   Relationships    Social connections     Talks on phone: Not on file     Gets together: Not on file     Attends Rastafarian service: Not on file     Active member of club or organization: Not on file     Attends meetings of clubs or organizations: Not on file     Relationship status: Not on file    Intimate partner violence     Fear of current or ex partner: Not on file     Emotionally abused: Not on file     Physically abused: Not on file     Forced sexual activity: Not on file   Other Topics Concern    Not on file   Social History Narrative    Not on file         ALLERGIES: Pcn [penicillins]    Review of Systems   Constitutional: Negative. Negative for activity change. HENT: Negative. Eyes: Negative. Respiratory: Negative. Cardiovascular: Negative. Gastrointestinal: Negative. Genitourinary: Negative. Musculoskeletal: Negative. Skin: Negative. Neurological: Negative. Psychiatric/Behavioral: Negative. All other systems reviewed and are negative. Vitals:    07/07/20 0303 07/07/20 0350 07/07/20 0354 07/07/20 0403   BP: 128/75      Pulse:  (!) 48 (!) 47    Resp:       Temp:       SpO2: 97% 100% 100% 98%   Weight:       Height:                Physical Exam  Vitals signs and nursing note reviewed. Constitutional:       General: She is not in acute distress. Appearance: She is well-developed. She is not diaphoretic. HENT:      Head: Normocephalic and atraumatic. Right Ear: External ear normal.      Left Ear: External ear normal.      Nose: Nose normal.      Mouth/Throat:      Pharynx: No oropharyngeal exudate. Eyes:      General: No scleral icterus. Right eye: No discharge. Left eye: No discharge. Conjunctiva/sclera: Conjunctivae normal.      Pupils: Pupils are equal, round, and reactive to light. Neck:      Musculoskeletal: Normal range of motion and neck supple. Vascular: No JVD. Trachea: No tracheal deviation. Cardiovascular:      Rate and Rhythm: Normal rate and regular rhythm. Pulmonary:      Effort: Pulmonary effort is normal. No respiratory distress. Breath sounds: Normal breath sounds. No stridor. No wheezing.    Chest:      Chest wall: No tenderness. Abdominal:      General: Bowel sounds are normal. There is no distension. Palpations: Abdomen is soft. There is no mass. Tenderness: There is no abdominal tenderness. Musculoskeletal: Normal range of motion. General: No tenderness. Skin:     General: Skin is warm and dry. Coloration: Skin is not pale. Findings: No erythema or rash. Neurological:      Mental Status: She is alert and oriented to person, place, and time. Cranial Nerves: No cranial nerve deficit. Psychiatric:         Behavior: Behavior normal.         Thought Content: Thought content normal.          MDM  Number of Diagnoses or Management Options  Palpitations: minor  Diagnosis management comments: Several labs were checked again they remain normal patient is having her hypoxia and tachypnea tachycardia. She was monitored for over 3 hours and had no arrhythmias other than occasional PACs she will be again asked to see an outpatient for further work-up.        Amount and/or Complexity of Data Reviewed  Clinical lab tests: ordered and reviewed  Tests in the radiology section of CPT®: ordered and reviewed  Tests in the medicine section of CPT®: ordered and reviewed           Procedures

## 2020-07-07 NOTE — ED NOTES
I have reviewed discharge instructions with the patient. The patient verbalized understanding. Patient left ED via Discharge Method: ambulatory to Home with self via taxi cab . Opportunity for questions and clarification provided. Patient given 1 scripts. Pt in no acute distress at time of d/c        To continue your aftercare when you leave the hospital, you may receive an automated call from our care team to check in on how you are doing. This is a free service and part of our promise to provide the best care and service to meet your aftercare needs.  If you have questions, or wish to unsubscribe from this service please call [phone]. Thank you for Choosing our [facility] Emergency Department.

## 2022-04-06 PROBLEM — R73.09 ELEVATED GLUCOSE: Status: ACTIVE | Noted: 2022-04-06

## 2022-04-06 PROBLEM — F11.24 OPIOID DEPENDENCE WITH OPIOID-INDUCED MOOD DISORDER (HCC): Status: ACTIVE | Noted: 2022-04-06

## 2022-04-06 PROBLEM — E66.01 CLASS 2 SEVERE OBESITY DUE TO EXCESS CALORIES WITH SERIOUS COMORBIDITY AND BODY MASS INDEX (BMI) OF 37.0 TO 37.9 IN ADULT (HCC): Status: ACTIVE | Noted: 2022-04-06

## 2022-04-11 PROBLEM — R73.09 ELEVATED GLUCOSE: Status: ACTIVE | Noted: 2022-04-06

## 2022-04-11 PROBLEM — F11.24 OPIOID DEPENDENCE WITH OPIOID-INDUCED MOOD DISORDER (HCC): Status: ACTIVE | Noted: 2022-04-06

## 2022-04-11 PROBLEM — E66.01 CLASS 2 SEVERE OBESITY DUE TO EXCESS CALORIES WITH SERIOUS COMORBIDITY AND BODY MASS INDEX (BMI) OF 37.0 TO 37.9 IN ADULT (HCC): Status: ACTIVE | Noted: 2022-04-06

## 2022-04-11 PROBLEM — E66.812 CLASS 2 SEVERE OBESITY DUE TO EXCESS CALORIES WITH SERIOUS COMORBIDITY AND BODY MASS INDEX (BMI) OF 37.0 TO 37.9 IN ADULT: Status: ACTIVE | Noted: 2022-04-06

## 2022-04-20 PROBLEM — F41.9 ANXIETY: Status: ACTIVE | Noted: 2022-04-20

## 2022-04-20 PROBLEM — E78.2 MIXED HYPERLIPIDEMIA: Status: ACTIVE | Noted: 2022-04-20

## 2022-04-20 PROBLEM — E83.52 HYPERCALCEMIA: Status: ACTIVE | Noted: 2022-04-20

## 2022-04-20 PROBLEM — R79.89 LOW SERUM VITAMIN D: Status: ACTIVE | Noted: 2022-04-20

## 2022-05-24 ENCOUNTER — NURSE TRIAGE (OUTPATIENT)
Dept: OTHER | Facility: CLINIC | Age: 37
End: 2022-05-24

## 2022-05-24 NOTE — TELEPHONE ENCOUNTER
Received call from [facility] with [place]. Subjective: Caller states \"My bones hurt all the time. My left abdomen hurts and I feel bloated. \"     Current Symptoms: Nausea, left sided abdominal pain, left leg swelling, bloating, heartburn      Onset: x3 days for abdominal pain/bloating/nausea, left leg swelling     Associated Symptoms: NA     Pain Severity: 6/10; uncomfortable; intermittent    Temperature: Denies fever- has had chills     What has been tried: Suboxone, ibuprofen       LMP: Tubes are tied  Pregnant: No    Recommended disposition: Go to ED Now- Caller does not want to have to go to ED as she would feel more comfortable being seen in the office. She also states she has 3 kids and doesn't have anyone she trusts to be with them. Care advice provided, patient verbalizes understanding; denies any other questions or concerns; instructed to call back for any new or worsening symptoms. Called [facility] and spoke to [place]. When getting ready to transfer noticed caller had disconnected. Attention Provider: Thank you for allowing me to participate in the care of your patient. The patient was connected to triage in response to information provided to the E[facility]. Please do not respond through this encounter as the response is not directed to a shared pool.     Reason for Disposition   Pain lasting > 10 minutes and over 28years old and at least one cardiac risk factor    Protocols used: ABDOMINAL PAIN - UPPER-ADULT-OH

## 2022-06-06 ENCOUNTER — OFFICE VISIT (OUTPATIENT)
Dept: ENT CLINIC | Age: 37
End: 2022-06-06
Payer: COMMERCIAL

## 2022-06-06 VITALS — HEIGHT: 70 IN | RESPIRATION RATE: 18 BRPM | WEIGHT: 262 LBS | BODY MASS INDEX: 37.51 KG/M2

## 2022-06-06 DIAGNOSIS — J35.1 LINGUAL TONSIL HYPERTROPHY: Primary | ICD-10-CM

## 2022-06-06 DIAGNOSIS — E83.52 HYPERCALCEMIA: ICD-10-CM

## 2022-06-06 DIAGNOSIS — J02.9 SORE THROAT: ICD-10-CM

## 2022-06-06 PROCEDURE — 99204 OFFICE O/P NEW MOD 45 MIN: CPT | Performed by: OTOLARYNGOLOGY

## 2022-06-06 PROCEDURE — 31575 DIAGNOSTIC LARYNGOSCOPY: CPT | Performed by: OTOLARYNGOLOGY

## 2022-06-06 RX ORDER — BUPRENORPHINE HYDROCHLORIDE AND NALOXONE HYDROCHLORIDE DIHYDRATE 8; 2 MG/1; MG/1
TABLET SUBLINGUAL DAILY
COMMUNITY
Start: 2022-03-21

## 2022-06-06 ASSESSMENT — ENCOUNTER SYMPTOMS
ABDOMINAL PAIN: 0
SHORTNESS OF BREATH: 0

## 2022-06-06 NOTE — PROGRESS NOTES
Chief Complaint   Patient presents with    New Patient     Patient states that she has her tonsils removed at 14years old and that she has a growh and other kinds of spots in her month and on her tounge. HPI:  Imtiaz Quiles is a 44 y.o. female seen today in initial consultation for her throat. She p/w concern for a R throat lesion. She previously underwent tonsillectomy at age 14. About 6 mo ago, she had a mild sore throat and then noted a spot along R side of her throat which has remained present since then. It seems to have enalrged somewhat, and there is even a white spot along the superior aspect. She also reports some bumps along posterior tongue and she c/o  intermittent choking sensastion, even w/ her own saliva. She has poor dentition and has had upper dentures since she was 15. She is former smoker. Her other concern today is hypercalcemia- this was noted this past yr on routine blood work and her Ca has been as high as 10.7- she had recent PTH which was 29. She has no previous h/o thyroid or parathyroid pathology. He was recently dx w/ a kidney stones but has no CKD. She has felt very \"panicky\" lately and not herself- also having very poor sleep. She has not had any imaging studies yet. Past Medical History, Past Surgical History, Family history, Social History, and Medications were all reviewed with the patient today and updated as necessary.      Allergies   Allergen Reactions    Penicillins Rash     Patient Active Problem List   Diagnosis    Opioid dependence with opioid-induced mood disorder (HCC)    Class 2 severe obesity due to excess calories with serious comorbidity and body mass index (BMI) of 37.0 to 37.9 in adult (HCC)    Elevated glucose    Low serum vitamin D    Mixed hyperlipidemia    Hypercalcemia    Anxiety     Current Outpatient Medications   Medication Sig    buprenorphine-naloxone (SUBOXONE) 8-2 MG SUBL SL tablet     amLODIPine (NORVASC) 10 MG tablet Take 10 mg by mouth daily    citalopram (CELEXA) 20 MG tablet Take 20 mg by mouth daily     No current facility-administered medications for this visit. Past Medical History:   Diagnosis Date    Anxiety     panic attacks    Calculus of kidney     Chronic kidney disease     kidney stones    Hypertension     Other ill-defined conditions(799.89)     Yearly sinus     Social History     Tobacco Use    Smoking status: Current Every Day Smoker     Packs/day: 0.50    Smokeless tobacco: Never Used   Substance Use Topics    Alcohol use: Yes     Past Surgical History:   Procedure Laterality Date     SECTION      x2    HEENT      tonsils     Family History   Problem Relation Age of Onset    Hypertension Father     Heart Attack Father         x4    Neuropathy Father         severe     Atrial Fibrillation Father     Other Sister         hysterectomy, appendix removal     Heart Disease Mother     Other Sister         appendix removal    Hypertension Mother     Other Mother         carpal tunnel, nerve damage    Asthma Sister         ROS:    Review of Systems   Constitutional: Negative for fever. Eyes: Negative for visual disturbance. Respiratory: Negative for shortness of breath. Cardiovascular: Negative for chest pain. Gastrointestinal: Negative for abdominal pain. Skin: Negative for rash. Neurological: Negative for dizziness and headaches. Hematological: Negative for adenopathy. Psychiatric/Behavioral: Negative for agitation. PHYSICAL EXAM:    Resp 18   Ht 5' 10\" (1.778 m)   Wt 262 lb (118.8 kg)   BMI 37.59 kg/m²     General: NAD, well-appearing  Neuro: No gross neuro deficits. CN's II-XII intact. No facial weakness. Eyes: EOMI. Pupils reactive. No periorbital edema/ecchymosis. No nystagmus. Skin: No facial erythema, rashes or concerning lesions. Nose: No external deviations or saddling.  Intranasally, septum is midline without perforations, nasal mucosa appears healthy with no erythema, mucopurulence, or polyps. Mouth: Moist mucus membranes, normal tongue/palate mobility, no concerning mucosal lesions. Oropharynx reveals healed tonsillar fossa bilaterally w/ mild scarring and no residual tonsillar tissue. There is a mass seen along BOT mainly on R side. There are prominent but otherwise normal appearing circumvallate papillae. Ears: Normal appearing auricles, no hematomas. EACs clear with no cerumen impaction, healthy canal skin, TM's intact with no perforations or retraction pockets. No middle ear effusions. Neck: Soft, supple, no palpable lateral neck masses. No palpable parotid or submandibular masses. No thyromegaly or palpable thyroid nodules. No surgical scars. Lymphatics: No palpable cervical LAD. Resp: No audible stridor or wheezing. CV: No murmurs, no JVD. Extremities: No clubbing or cyanosis. PROCEDURE: Flexible laryngoscopy  INDICATIONS: Pharyngeal mass  DESCRIPTION: After verbal consent was obtained and a timeout was performed, the flexible scope was advanced down one of the patient's nares into the nasopharynx. The nasal cavity and nasopharynx were clear. The scope was then turned distally down towards the oropharynx. There was prominent lingual tonsillar tissue bilaterally- larger on R side. The vallecula was clear and the epiglottis was normal in appearance. Both aryepiglottic folds were clear and there was a normal appearing glottis w/ healthy TVCs. No nodules or polyps and the cords were fully mobile. No concerning lesions seen along post-cricoid region or within either piriform sinus. The posterior pharyngeal was clear as well. The scope was then carefully removed. The patient tolerated the procedure well and there were no complications. ASSESSMENT and PLAN      ICD-10-CM    1. Lingual tonsil hypertrophy  J35.1 LARYNGOSCOPY,FLEX FIBER,DIAGNOSTIC   2. Sore throat  J02.9 LARYNGOSCOPY,FLEX FIBER,DIAGNOSTIC   3.  Hypercalcemia  E83.52      I scoped her today and she had R > L lingual tonsillar hypertrophy but there were no concerning masses or lesions. She had prominent circumvallate papillae that otherwise looked normal in appearance. She does have hypercalcemia and may have underlying primary HPTH- her PTH was normal but she also has Vit D deficiency. I will run her labs past one of my Endocrinology colleagues before ordering parathyroid imaging studies. I will call her w/ further steps regarding her poss hyperparathyroidism.     Mahad Kearns MD  6/6/2022    Electronically signed by Mahad Kearns MD on 6/6/2022 at 6:55 PM

## 2022-06-08 ENCOUNTER — OFFICE VISIT (OUTPATIENT)
Dept: INTERNAL MEDICINE CLINIC | Facility: CLINIC | Age: 37
End: 2022-06-08
Payer: MEDICAID

## 2022-06-08 VITALS
SYSTOLIC BLOOD PRESSURE: 120 MMHG | DIASTOLIC BLOOD PRESSURE: 80 MMHG | HEART RATE: 86 BPM | BODY MASS INDEX: 37.37 KG/M2 | HEIGHT: 70 IN | OXYGEN SATURATION: 97 % | WEIGHT: 261 LBS | TEMPERATURE: 97.8 F

## 2022-06-08 DIAGNOSIS — M79.89 LEG SWELLING: ICD-10-CM

## 2022-06-08 DIAGNOSIS — N20.0 RIGHT KIDNEY STONE: Primary | ICD-10-CM

## 2022-06-08 DIAGNOSIS — I10 PRIMARY HYPERTENSION: ICD-10-CM

## 2022-06-08 DIAGNOSIS — E83.52 HYPERCALCEMIA: ICD-10-CM

## 2022-06-08 PROCEDURE — 99214 OFFICE O/P EST MOD 30 MIN: CPT | Performed by: NURSE PRACTITIONER

## 2022-06-08 RX ORDER — VALSARTAN 80 MG/1
80 TABLET ORAL DAILY
Qty: 30 TABLET | Refills: 5 | Status: SHIPPED | OUTPATIENT
Start: 2022-06-08 | End: 2022-07-05

## 2022-06-08 RX ORDER — AMLODIPINE BESYLATE 5 MG/1
5 TABLET ORAL DAILY
Qty: 30 TABLET | Refills: 5 | Status: SHIPPED | OUTPATIENT
Start: 2022-06-08 | End: 2022-07-05

## 2022-06-08 ASSESSMENT — PATIENT HEALTH QUESTIONNAIRE - PHQ9
2. FEELING DOWN, DEPRESSED OR HOPELESS: 0
SUM OF ALL RESPONSES TO PHQ QUESTIONS 1-9: 0
1. LITTLE INTEREST OR PLEASURE IN DOING THINGS: 0
SUM OF ALL RESPONSES TO PHQ9 QUESTIONS 1 & 2: 0
SUM OF ALL RESPONSES TO PHQ QUESTIONS 1-9: 0

## 2022-06-08 NOTE — PROGRESS NOTES
Dominguez Calvin (:  1985) is a 44 y.o. female,Established patient, here for evaluation of the following chief complaint(s):  Follow-up (ED )         ASSESSMENT/PLAN:  1. Right kidney stone  -     [facility], [place]  2. Hypercalcemia  -     [address], [place]  3. Primary hypertension  4. Leg swelling      No follow-ups on file. Reviewed hospital lab and imaging  7mm stone  Refer to urology, hydrate well  Continued hypercalcemia, refer endocrinology for further work up, reviewed ENT note   Leg swelling on amlodipine, decrease amlodipine and add valsartan, bp recheck 2 week    Subjective   SUBJECTIVE/OBJECTIVE:  Patient is here for ER follow up. She had pain in left abdomen and went to ER . She went to [facility] ER. She is still concerned about hypercalcemia, she saw ENT and her tonsils were ok. She is concerned about the elevated calcium and would like endocrine referral.       Review of Systems       Objective   Physical Exam  Vitals reviewed. Constitutional:       Appearance: Normal appearance. She is obese. She is not ill-appearing. HENT:      Head: Normocephalic. Right Ear: External ear normal.      Left Ear: External ear normal.   Eyes:      Extraocular Movements: Extraocular movements intact. Pupils: Pupils are equal, round, and reactive to light. Cardiovascular:      Rate and Rhythm: Normal rate and regular rhythm. Pulmonary:      Effort: Pulmonary effort is normal.      Breath sounds: Normal breath sounds. Musculoskeletal:      Cervical back: Neck supple. Neurological:      General: No focal deficit present. Mental Status: She is alert and oriented to person, place, and time. Psychiatric:         Mood and Affect: Mood normal.         Behavior: Behavior normal.                  An electronic signature was used to authenticate this note.     --Cam Hendrix, APRN - CNP

## 2022-06-09 ENCOUNTER — TELEPHONE (OUTPATIENT)
Dept: INTERNAL MEDICINE CLINIC | Facility: CLINIC | Age: 37
End: 2022-06-09

## 2022-06-09 DIAGNOSIS — E21.0 PRIMARY HYPERPARATHYROIDISM (HCC): Primary | ICD-10-CM

## 2022-06-09 NOTE — TELEPHONE ENCOUNTER
She would likely fell better if her legs aren't swelling. She could do olmesartan or another ARB in the class if she would prefer.    Thanks  Milagros Maurer

## 2022-06-09 NOTE — PROGRESS NOTES
Spoke to pt about her labs- I will refer her to Endocrinology. I had spoken to their PA Mani about her case earlier this wk.

## 2022-06-09 NOTE — TELEPHONE ENCOUNTER
Patient notified, and states she will take whatever you think is best she just saw a lot of bad reviews and side effects and recall on the valsartan and did not want to throw in the issues the valsartan could cause to the mix. Please advise.

## 2022-06-10 NOTE — TELEPHONE ENCOUNTER
I would not expect her to have any side effects, and decreasing the amlodipine dose should definitely help her leg swelling.

## 2022-06-22 ENCOUNTER — OFFICE VISIT (OUTPATIENT)
Dept: UROLOGY | Age: 37
End: 2022-06-22
Payer: COMMERCIAL

## 2022-06-22 DIAGNOSIS — N20.0 KIDNEY STONE: Primary | ICD-10-CM

## 2022-06-22 LAB
BILIRUBIN, URINE, POC: NEGATIVE
BLOOD URINE, POC: NEGATIVE
GLUCOSE URINE, POC: NEGATIVE
KETONES, URINE, POC: NEGATIVE
LEUKOCYTE ESTERASE, URINE, POC: NEGATIVE
NITRITE, URINE, POC: NEGATIVE
PH, URINE, POC: 7 (ref 4.6–8)
PROTEIN,URINE, POC: NEGATIVE
SPECIFIC GRAVITY, URINE, POC: 1.01 (ref 1–1.03)
URINALYSIS CLARITY, POC: ABNORMAL
URINALYSIS COLOR, POC: ABNORMAL
UROBILINOGEN, POC: ABNORMAL

## 2022-06-22 PROCEDURE — 99203 OFFICE O/P NEW LOW 30 MIN: CPT | Performed by: NURSE PRACTITIONER

## 2022-06-22 PROCEDURE — 81003 URINALYSIS AUTO W/O SCOPE: CPT | Performed by: NURSE PRACTITIONER

## 2022-06-22 ASSESSMENT — ENCOUNTER SYMPTOMS
NAUSEA: 1
CONSTIPATION: 1
INDIGESTION: 1
HEARTBURN: 1
SHORTNESS OF BREATH: 1
BACK PAIN: 1
SKIN LESIONS: 1
VOMITING: 1

## 2022-06-22 NOTE — PROGRESS NOTES
[facility]  [address] [place] [address]    [address] [place] [address], [address]  : 1985    Chief Complaint   Patient presents with    Nephrolithiasis          HPI     Vin Bailey is a 44 y.o. female w hx of HTN and anxiety being seen today as a new pt referred by PCP for kidney stone. Presented to [address] [place] [address] on 22 w c/o abd pain, CP, and SOB. Had CT abd pelvis wo contrast completed revealing a 7 mm non obstructing RLP stone. Cr 0.72. Ca 10.7. WBC 8.1. Pt left ER before being evaluated by physician. Today, pt reports she is asx. Hx of stones beginning around . Has never had surgical intervention. KUB in office today reviewed by myself and can not visualize stone due to stool/gas pattern. Had been referred to endocrinology for hypercalcemia. Quite concerned about this. Past Medical History:   Diagnosis Date    Anxiety     panic attacks    Calculus of kidney     Chronic kidney disease     kidney stones    Hypertension     Other ill-defined conditions(799.89)     Yearly sinus     Past Surgical History:   Procedure Laterality Date     SECTION      x2    HEENT      tonsils     Current Outpatient Medications   Medication Sig Dispense Refill    amLODIPine (NORVASC) 5 MG tablet Take 1 tablet by mouth daily 30 tablet 5    valsartan (DIOVAN) 80 mg tablet Take 1 tablet by mouth daily 30 tablet 5    buprenorphine-naloxone (SUBOXONE) 8-2 MG SUBL SL tablet       citalopram (CELEXA) 10 MG tablet Take 10 mg by mouth daily        No current facility-administered medications for this visit.      Allergies   Allergen Reactions    Penicillins Rash     Social History     Socioeconomic History    Marital status: Single     Spouse name: Not on file    Number of children: Not on file    Years of education: Not on file    Highest education level: Not on file   Occupational History    Not on file   Tobacco Use    Smoking status: Current Every Day Smoker     Packs/day: 0.50    Smokeless tobacco: Never Used   Substance and Sexual Activity    Alcohol use: Yes    Drug use: No    Sexual activity: Not on file     Comment: lmp- 2 weeks ago   Other Topics Concern    Not on file   Social History Narrative    Not on file     Social Determinants of Health     Financial Resource Strain:     Difficulty of Paying Living Expenses: Not on file   Food Insecurity:     Worried About Running Out of Food in the Last Year: Not on file    Sonia of Food in the Last Year: Not on file   Transportation Needs:     Lack of Transportation (Medical): Not on file    Lack of Transportation (Non-Medical):  Not on file   Physical Activity:     Days of Exercise per Week: Not on file    Minutes of Exercise per Session: Not on file   Stress:     Feeling of Stress : Not on file   Social Connections:     Frequency of Communication with Friends and Family: Not on file    Frequency of Social Gatherings with Friends and Family: Not on file    Attends Restoration Services: Not on file    Active Member of [address] [place] [address] [employer] or Organizations: Not on file    Attends Club or Organization Meetings: Not on file    Marital Status: Not on file   Intimate Partner Violence:     Fear of Current or Ex-Partner: Not on file    Emotionally Abused: Not on file    Physically Abused: Not on file    Sexually Abused: Not on file   Housing Stability:     Unable to Pay for Housing in the Last Year: Not on file    Number of [place] in the Last Year: Not on file    Unstable Housing in the Last Year: Not on file     Family History   Problem Relation Age of Onset    Hypertension Father     Heart Attack Father         x4    Neuropathy Father         severe     Atrial Fibrillation Father     Other Sister         hysterectomy, appendix removal     Heart Disease Mother     Other Sister         appendix removal    Hypertension Mother    Connye Sole Other Mother         carpal tunnel, nerve damage  Asthma Sister        Review of Systems  Constitutional: Positive for fever, chills, appetite change, malaise/fatigue and headaches. Skin: Positive for skin lesions and rash. ENT: Positive for pain swallowing, high frequency hearing loss and dry mouth. Respiratory: Positive for shortness of breath. Cardiovascular: Positive for chest pain, hypertension, irregular heartbeat, leg pain, leg swelling, regular rate and rhythm and varicose veins. GI: Positive for nausea, vomiting, constipation, indigestion and heartburn. Genitourinary: Positive for urinary burning, hematuria, flank pain, history of urolithiasis, nocturia, straining, urgency, frequent urination, incomplete emptying and menstrual problem. Number of pregnancies: 2. Number of births: 3. Musculoskeletal: Positive for back pain, bone pain, arthralgias, tenderness, muscle weakness and neck pain. Neurological: Positive for dizziness and numbness. Endocrine: Positive for cold intolerance, thirst, excessive urination, fatigue and heat intolerance.       Urinalysis  UA - Dipstick  Results for orders placed or performed in visit on 06/22/22   AMB POC URINALYSIS DIP STICK AUTO W/O MICRO   Result Value Ref Range    Color (UA POC)      Clarity (UA POC)      Glucose, Urine, POC Negative Negative    Bilirubin, Urine, POC Negative Negative    KETONES, Urine, POC Negative Negative    Specific Gravity, Urine, POC 1.015 1.001 - 1.035    Blood (UA POC) Negative Negative    pH, Urine, POC 7.0 4.6 - 8.0    Protein, Urine, POC Negative Negative    Urobilinogen, POC 2 mg/dL     Nitrite, Urine, POC Negative Negative    Leukocyte Esterase, Urine, POC Negative Negative       PHYSICAL EXAM    General appearance - well appearing and in no distress  Mental status - alert, oriented to person, place, and time  Neck - supple, no significant adenopathy  Chest/Lung-  Quiet, even and easy respiratory effort without use of accessory muscles  Skin - normal coloration and turgor, no alexx        Assessment and Plan    ICD-10-CM    1. Kidney stone  N20.0 AMB POC URINALYSIS DIP STICK AUTO W/O MICRO     AMB POC XRAY ABDOMEN 1 VIEW   Discussed tx options for right renal stone including observation vs ESWL (w clear KUB) vs URS. Pt opts to monitor for now. I have educated pt. about diet modifications that can decrease the risk of future calcium stone formation. These include decreasing things high in oxalate such as teas and benja. Also, I have encouraged pt. to increase clear liquid intake, especially H2O. Advised the recommended water consumption is 3 liter per day. Things high in citrate such as lemon juice should also be increased. ROV in 3 months for recheck. To call sooner if needed. Willow Lei is supervising physician today and he approves plan of care.

## 2022-07-05 RX ORDER — AMLODIPINE BESYLATE 10 MG/1
10 TABLET ORAL DAILY
Qty: 30 TABLET | Refills: 4 | Status: SHIPPED | OUTPATIENT
Start: 2022-07-05

## 2022-08-03 ENCOUNTER — TELEMEDICINE (OUTPATIENT)
Dept: ENDOCRINOLOGY | Age: 37
End: 2022-08-03
Payer: COMMERCIAL

## 2022-08-03 DIAGNOSIS — E83.52 HYPERCALCEMIA: Primary | ICD-10-CM

## 2022-08-03 DIAGNOSIS — Z87.442 HISTORY OF NEPHROLITHIASIS: ICD-10-CM

## 2022-08-03 DIAGNOSIS — R79.89 LOW SERUM VITAMIN D: ICD-10-CM

## 2022-08-03 DIAGNOSIS — M89.8X9 BONE PAIN: ICD-10-CM

## 2022-08-03 PROCEDURE — 99215 OFFICE O/P EST HI 40 MIN: CPT | Performed by: PHYSICIAN ASSISTANT

## 2022-08-03 ASSESSMENT — ENCOUNTER SYMPTOMS
NAUSEA: 1
EYE REDNESS: 1
CONSTIPATION: 1

## 2022-08-03 NOTE — PROGRESS NOTES
[facility] ENDOCRINOLOGY   AND   THYROID NODULE CLINIC    Maria Dolores Benson PA-C  [address] [facility]  [address], [address]  [place], [place]  Phone [phone]  Facsimile [phone]          Mirella Wheeler is a 44 y.o. female seen 8/3/2022 at the request of Ms. Chavo Colby for the evaluation of hypercalcemia        Assessment and Plan:    Pursuant to the emergency declaration under the [facility], [facility] waiver authority and the [employer] Act, this Virtual Visit was conducted, with patient's consent, to reduce the patient's risk of exposure to COVID-19 and provide continuity of care for an established patient. Services were provided through a video synchronous discussion virtually to substitute for in-person clinic visit. Total Time: minutes: >40 .        1. Hypercalcemia  Patient with hypercalcemia which appears symptomatic in the context of recurrent nephrolithiasis presents for new patient evaluation. Previous labs show elevated calcium with inappropriately normal PTH. Patient has multiple symptoms including bones, stones, psychiatric overtones and bone pain as below. Obtain labs including 24-hour urine calcium.  - TSH; Future  - T4, Free; Future  - Phosphorus; Future  - Magnesium; Future  - PTH, Intact; Future  - Calcium, Ionized; Future  - Vitamin D 25 Hydroxy; Future  - Comprehensive Metabolic Panel; Future  - Electrophoresis Protein, Serum; Future  - PTH-Related Peptide; Future  - Vitamin D 1,25 Dihydroxy; Future  - Calcium, 24 HR Urine; Future  - Creatinine, 24 HR Urine; Future    2. Low serum vitamin D  Slightly low vitamin D in the context of hypercalcemia, not currently on therapy. Careful repletion should be considered due to HX of nephrolithasis   - Vitamin D 25 Hydroxy; Future  - Vitamin D 1,25 Dihydroxy;  Future  - Calcium, 24 HR Urine; Future  - Creatinine, 24 HR Urine; Future    3. Bone pain    - TSH; Future  - T4, Free; Future  - Phosphorus; Future  - Magnesium; Future  - PTH, Intact; Future  - Calcium, Ionized; Future  - Vitamin D 25 Hydroxy; Future  - Comprehensive Metabolic Panel; Future  - Electrophoresis Protein, Serum; Future  - PTH-Related Peptide; Future  - Vitamin D 1,25 Dihydroxy; Future  - Calcium, 24 HR Urine; Future  - Creatinine, 24 HR Urine; Future    4. History of nephrolithiasis  Under care of urology  - TSH; Future  - T4, Free; Future  - Phosphorus; Future  - Magnesium; Future  - PTH, Intact; Future  - Calcium, Ionized; Future  - Vitamin D 25 Hydroxy; Future  - Comprehensive Metabolic Panel; Future  - Electrophoresis Protein, Serum; Future  - PTH-Related Peptide; Future  - Vitamin D 1,25 Dihydroxy; Future  - Calcium, 24 HR Urine; Future  - Creatinine, 24 HR Urine; Future      [facility] for [facility] patient. Diagnoses and all orders for this visit:    Hypercalcemia  -     TSH; Future  -     T4, Free; Future  -     Phosphorus; Future  -     Magnesium; Future  -     PTH, Intact; Future  -     Calcium, Ionized; Future  -     Vitamin D 25 Hydroxy; Future  -     Comprehensive Metabolic Panel; Future  -     Electrophoresis Protein, Serum; Future  -     PTH-Related Peptide; Future  -     Vitamin D 1,25 Dihydroxy; Future  -     Calcium, 24 HR Urine; Future  -     Creatinine, 24 HR Urine; Future    Low serum vitamin D  -     Vitamin D 25 Hydroxy; Future  -     Vitamin D 1,25 Dihydroxy; Future  -     Calcium, 24 HR Urine; Future  -     Creatinine, 24 HR Urine; Future    Bone pain  -     TSH; Future  -     T4, Free; Future  -     Phosphorus; Future  -     Magnesium; Future  -     PTH, Intact; Future  -     Calcium, Ionized; Future  -     Vitamin D 25 Hydroxy; Future  -     Comprehensive Metabolic Panel; Future  -     Electrophoresis Protein, Serum; Future  -     PTH-Related Peptide;  Future  -     Vitamin D 1,25 Dihydroxy; Future  -     Calcium, 24 HR Urine; Future  -     Creatinine, 24 HR Urine; Future    History of nephrolithiasis  -     TSH; Future  -     T4, Free; Future  -     Phosphorus; Future  -     Magnesium; Future  -     PTH, Intact; Future  -     Calcium, Ionized; Future  -     Vitamin D 25 Hydroxy; Future  -     Comprehensive Metabolic Panel; Future  -     Electrophoresis Protein, Serum; Future  -     PTH-Related Peptide; Future  -     Vitamin D 1,25 Dihydroxy; Future  -     Calcium, 24 HR Urine; Future  -     Creatinine, 24 HR Urine; Future          History of Present Illness:    8/3/2022  VIRTUAL VISIT  Po Laguerre is a 44 y.o. female who was seen by synchronous (real-time) audio-video technology on 8/3/2022 . The patient provided consent for this audio-video interaction. The [employer] platform was used. Patient was located at their home and provider in the office. HYPERCALCEMIA      Presentation/Diagnosis:    Noted hypercalcemia since aug 2021    Risk Factors:  Denies the use of calcium, vitamin D, thiazides, lithium. Stopped multivitamin    No family history of hypercalcemia, hyperparathyroidism, multiple endocrine neoplasia. Symptoms:     history of recurrent kidney stones for past 10 years    Denies hematuria    C/o: Fatigue, constipation, abdominal pain with nausea and bloating, constant bone and joint pain, myalgias  memory problems x 1 year. polyuria, polydipsia.       Intermittent and mild right flank pain    Denies depression but has severe and worsening anxiety (improved on citalopram)     Dry skin, hair falling out, eyes dry and crusty and red with some burning, blurred vision    Left neck spasm       Imaging:      Fracture History:  none    Treatment:    Contraindications to treatment:        Pertinent Labs:    Calcium  04/20/2022 10.3  05/26/2022 10.7    Vitamin D  04/06/2022 26.1    PTH  04/20/2022 29 (inappropriately normal) with calcium 10.3    Phosphorus    Magnesium  04/20/2022 2.0. SPEP    Thyroid  04/06/2022 1.350    Renal function    05/26/2022 Cr 0.72, GFR >90        Allergies & Medications:  Reviewed in chart. Review of Systems   Constitutional:  Positive for fatigue. Eyes:  Positive for redness and visual disturbance. Cardiovascular:  Positive for leg swelling (left>right). Gastrointestinal:  Positive for constipation and nausea. ABD BLOATING   Endocrine: Positive for polydipsia and polyuria. Negative for polyphagia. Musculoskeletal:  Positive for arthralgias and myalgias. Psychiatric/Behavioral:  The patient is nervous/anxious. Vital Signs: There were no vitals taken for this visit. Return in about 3 months (around 11/3/2022), or NF labs soon. Portions of this note were generated with the assistance of voice recogniton software. As such, some errors in transcription may be present.

## 2022-08-15 DIAGNOSIS — Z87.442 HISTORY OF NEPHROLITHIASIS: ICD-10-CM

## 2022-08-15 DIAGNOSIS — R79.89 LOW SERUM VITAMIN D: ICD-10-CM

## 2022-08-15 DIAGNOSIS — E83.52 HYPERCALCEMIA: ICD-10-CM

## 2022-08-15 DIAGNOSIS — M89.8X9 BONE PAIN: ICD-10-CM

## 2022-08-15 LAB
COLLECT DURATION TIME UR: 24 HR
CREAT 24H UR-MRATE: 777 MG/24 HR (ref 600–1800)
CREAT UR-MCNC: 42 MG/DL
SPECIMEN VOL ?TM UR: 1850 ML

## 2022-08-16 ENCOUNTER — TELEPHONE (OUTPATIENT)
Dept: ENDOCRINOLOGY | Age: 37
End: 2022-08-16

## 2022-08-16 LAB
CALCIUM 24H UR-MCNC: 5.7 MG/DL
CALCIUM 24H UR-MRATE: 105 MG/24 HR (ref 0–320)
COLLECT DURATION TIME UR: 24 HR
SPECIMEN VOL ?TM UR: 1850 ML

## 2022-08-17 NOTE — TELEPHONE ENCOUNTER
Patient given lab results. Patient advised per provider to take a low dose Vitamin D3 supplement. Patient verbalized understanding but also had concern about her symptoms and labs that were out of range. Patient stated that she doesn't understand how she can feel bad but her labs don't show anything.

## 2022-10-17 DIAGNOSIS — E83.52 HYPERCALCEMIA: ICD-10-CM

## 2022-10-17 DIAGNOSIS — I10 PRIMARY HYPERTENSION: Primary | ICD-10-CM

## 2022-10-17 DIAGNOSIS — R79.89 LOW VITAMIN D LEVEL: ICD-10-CM

## 2022-10-26 ENCOUNTER — OFFICE VISIT (OUTPATIENT)
Dept: UROLOGY | Age: 37
End: 2022-10-26
Payer: MEDICAID

## 2022-10-26 DIAGNOSIS — N20.0 KIDNEY STONE: Primary | ICD-10-CM

## 2022-10-26 LAB
BILIRUBIN, URINE, POC: NEGATIVE
BLOOD URINE, POC: NEGATIVE
GLUCOSE URINE, POC: NEGATIVE
KETONES, URINE, POC: NEGATIVE
LEUKOCYTE ESTERASE, URINE, POC: NEGATIVE
NITRITE, URINE, POC: NEGATIVE
PH, URINE, POC: 6 (ref 4.6–8)
PROTEIN,URINE, POC: NEGATIVE
SPECIFIC GRAVITY, URINE, POC: 1.01 (ref 1–1.03)
URINALYSIS CLARITY, POC: NORMAL
URINALYSIS COLOR, POC: NORMAL
UROBILINOGEN, POC: NORMAL

## 2022-10-26 PROCEDURE — 99214 OFFICE O/P EST MOD 30 MIN: CPT | Performed by: NURSE PRACTITIONER

## 2022-10-26 PROCEDURE — 81003 URINALYSIS AUTO W/O SCOPE: CPT | Performed by: NURSE PRACTITIONER

## 2022-10-26 ASSESSMENT — ENCOUNTER SYMPTOMS
RESPIRATORY NEGATIVE: 1
EYES NEGATIVE: 1

## 2022-10-26 NOTE — H&P (VIEW-ONLY)
[facility]  [address] [place] [address]    [address] [place] [address], [address]  : 1985    Chief Complaint   Patient presents with    Nephrolithiasis          HPI     Sheri Doran is a 44 y.o. female w hx of HTN and anxiety being seen today as a new pt referred by PCP for kidney stone. Presented to [address] [place] [address] on 22 w c/o abd pain, CP, and SOB. Had CT abd pelvis wo contrast completed revealing a 7 mm non obstructing RLP stone. Cr 0.72. Ca 10.7. WBC 8.1. Pt left ER before being evaluated by physician. Today, pt reports worsened amina flank pain R>L. Afebrile. Hx of stones beginning around . Has never had surgical intervention. KUB in office today reviewed by myself and shows 5-6 mm stone to RLP. Had been referred to endocrinology for hypercalcemia. Ca was normal w last blood draw. Cr 0.60. No BT. Past Medical History:   Diagnosis Date    Anxiety     panic attacks    Calculus of kidney     Chronic kidney disease     kidney stones    Hypertension     Other ill-defined conditions(799.89)     Yearly sinus     Past Surgical History:   Procedure Laterality Date     SECTION      x2    HEENT      tonsils     Current Outpatient Medications   Medication Sig Dispense Refill    amLODIPine (NORVASC) 10 MG tablet Take 1 tablet by mouth daily 30 tablet 4    buprenorphine-naloxone (SUBOXONE) 8-2 MG SUBL SL tablet       citalopram (CELEXA) 10 MG tablet Take 10 mg by mouth daily        No current facility-administered medications for this visit.      Allergies   Allergen Reactions    Penicillins Rash     Social History     Socioeconomic History    Marital status: Single     Spouse name: Not on file    Number of children: Not on file    Years of education: Not on file    Highest education level: Not on file   Occupational History    Not on file   Tobacco Use    Smoking status: Every Day     Packs/day: 0.50     Types: Cigarettes    Smokeless tobacco: Never   Substance and Sexual Activity    Alcohol use: Yes    Drug use: No    Sexual activity: Not on file     Comment: lmp- 2 weeks ago   Other Topics Concern    Not on file   Social History Narrative    Not on file     Social Determinants of Health     Financial Resource Strain: Not on file   Food Insecurity: Not on file   Transportation Needs: Not on file   Physical Activity: Not on file   Stress: Not on file   Social Connections: Not on file   Intimate Partner Violence: Not on file   Housing Stability: Not on file     Family History   Problem Relation Age of Onset    Hypertension Father     Heart Attack Father         x4    Neuropathy Father         severe     Atrial Fibrillation Father     Other Sister         hysterectomy, appendix removal     Heart Disease Mother     Other Sister         appendix removal    Hypertension Mother     Other Mother         carpal tunnel, nerve damage    Asthma Sister        Review of Systems  Constitutional: Negative  Skin: Negative skin ROS  Eyes: Eyes negative  ENT: HENT negative  Respiratory: Respiratory negative  Cardiovascular: Positive for hypertension. GI: Neg GI ROS  Genitourinary: Positive for history of urolithiasis.   Musculoskeletal: Musculoskeletal negative  Neurological: Neg neuro ROS  Psychological: Neg psych ROS  Endocrine: Endocrine negative  Hem/Lymphatic: Hematologic/lymphatic negative    Urinalysis  UA - Dipstick  Results for orders placed or performed in visit on 10/26/22   AMB POC URINALYSIS DIP STICK AUTO W/O MICRO   Result Value Ref Range    Color (UA POC)      Clarity (UA POC)      Glucose, Urine, POC Negative Negative    Bilirubin, Urine, POC Negative Negative    KETONES, Urine, POC Negative Negative    Specific Gravity, Urine, POC 1.015 1.001 - 1.035    Blood (UA POC) Negative Negative    pH, Urine, POC 6.0 4.6 - 8.0    Protein, Urine, POC Negative Negative    Urobilinogen, POC Normal     Nitrite, Urine, POC Negative Negative    Leukocyte Esterase, Urine, POC Negative Negative       PHYSICAL EXAM    There were no vitals taken for this visit. General appearance - well appearing and in no distress  Mental status - alert, oriented to person, place, and time  Neck - supple, no significant adenopathy   Heart- RRR Normal S1/S2, No murmurs  Chest/Lung-  Quiet, even and easy respiratory effort without use of accessory muscles, BS clear  Neurological - normal speech, no focal findings or movement disorder noted on gross visual exam  Musculoskeletal - normal gait and station  Skin - normal coloration and turgor, no rashes          Assessment and Plan    ICD-10-CM    1. Kidney stone  N20.0 AMB POC URINALYSIS DIP STICK AUTO W/O MICRO     AMB POC XRAY ABDOMEN 1 VIEW      Tx options for right renal stone discussed including observation w KUB vs ESWL vs URS. After our discussion, the patient would like to proceed with right ESWL. Risks of ESWL that we discussed were bleeding, infection, flank pain, bruising, renal hematoma, injury to surrounding structures, incomplete fragmentation of stones, steinstrasse, inability to pass stone fragments requiring additional operative intervention in the form of ureteroscopy/laser lithotripsy and/or stent placement, renal failure, hypertension, risks of general anesthesia, recurrent stones. The patient expressed understanding of the above. Will follow up after procedure. Advised to call sooner if needed. Alexa Del Toro is supervising physician today and he approves plan of care. I have spent 32 minutes today reviewing previous notes, test results and face to face with the patient as well as documenting.

## 2022-10-26 NOTE — PROGRESS NOTES
[facility]  [address] [place] [address]    [address] [place] [address], [address]  : 1985    Chief Complaint   Patient presents with    Nephrolithiasis          HPI     Caryn Neves is a 44 y.o. female w hx of HTN and anxiety being seen today as a new pt referred by PCP for kidney stone. Presented to [address] [place] [address] on 22 w c/o abd pain, CP, and SOB. Had CT abd pelvis wo contrast completed revealing a 7 mm non obstructing RLP stone. Cr 0.72. Ca 10.7. WBC 8.1. Pt left ER before being evaluated by physician. Today, pt reports worsened amina flank pain R>L. Afebrile. Hx of stones beginning around . Has never had surgical intervention. KUB in office today reviewed by myself and shows 5-6 mm stone to RLP. Had been referred to endocrinology for hypercalcemia. Ca was normal w last blood draw. Cr 0.60. No BT. Past Medical History:   Diagnosis Date    Anxiety     panic attacks    Calculus of kidney     Chronic kidney disease     kidney stones    Hypertension     Other ill-defined conditions(799.89)     Yearly sinus     Past Surgical History:   Procedure Laterality Date     SECTION      x2    HEENT      tonsils     Current Outpatient Medications   Medication Sig Dispense Refill    amLODIPine (NORVASC) 10 MG tablet Take 1 tablet by mouth daily 30 tablet 4    buprenorphine-naloxone (SUBOXONE) 8-2 MG SUBL SL tablet       citalopram (CELEXA) 10 MG tablet Take 10 mg by mouth daily        No current facility-administered medications for this visit.      Allergies   Allergen Reactions    Penicillins Rash     Social History     Socioeconomic History    Marital status: Single     Spouse name: Not on file    Number of children: Not on file    Years of education: Not on file    Highest education level: Not on file   Occupational History    Not on file   Tobacco Use    Smoking status: Every Day     Packs/day: 0.50     Types: Cigarettes    Smokeless tobacco: Never   Substance and Sexual Activity    Alcohol use: Yes    Drug use: No    Sexual activity: Not on file     Comment: lmp- 2 weeks ago   Other Topics Concern    Not on file   Social History Narrative    Not on file     Social Determinants of Health     Financial Resource Strain: Not on file   Food Insecurity: Not on file   Transportation Needs: Not on file   Physical Activity: Not on file   Stress: Not on file   Social Connections: Not on file   Intimate Partner Violence: Not on file   Housing Stability: Not on file     Family History   Problem Relation Age of Onset    Hypertension Father     Heart Attack Father         x4    Neuropathy Father         severe     Atrial Fibrillation Father     Other Sister         hysterectomy, appendix removal     Heart Disease Mother     Other Sister         appendix removal    Hypertension Mother     Other Mother         carpal tunnel, nerve damage    Asthma Sister        Review of Systems  Constitutional: Negative  Skin: Negative skin ROS  Eyes: Eyes negative  ENT: HENT negative  Respiratory: Respiratory negative  Cardiovascular: Positive for hypertension. GI: Neg GI ROS  Genitourinary: Positive for history of urolithiasis.   Musculoskeletal: Musculoskeletal negative  Neurological: Neg neuro ROS  Psychological: Neg psych ROS  Endocrine: Endocrine negative  Hem/Lymphatic: Hematologic/lymphatic negative    Urinalysis  UA - Dipstick  Results for orders placed or performed in visit on 10/26/22   AMB POC URINALYSIS DIP STICK AUTO W/O MICRO   Result Value Ref Range    Color (UA POC)      Clarity (UA POC)      Glucose, Urine, POC Negative Negative    Bilirubin, Urine, POC Negative Negative    KETONES, Urine, POC Negative Negative    Specific Gravity, Urine, POC 1.015 1.001 - 1.035    Blood (UA POC) Negative Negative    pH, Urine, POC 6.0 4.6 - 8.0    Protein, Urine, POC Negative Negative    Urobilinogen, POC Normal     Nitrite, Urine, POC Negative Negative    Leukocyte Esterase, Urine, POC Negative Negative       PHYSICAL EXAM    There were no vitals taken for this visit. General appearance - well appearing and in no distress  Mental status - alert, oriented to person, place, and time  Neck - supple, no significant adenopathy   Heart- RRR Normal S1/S2, No murmurs  Chest/Lung-  Quiet, even and easy respiratory effort without use of accessory muscles, BS clear  Neurological - normal speech, no focal findings or movement disorder noted on gross visual exam  Musculoskeletal - normal gait and station  Skin - normal coloration and turgor, no rashes          Assessment and Plan    ICD-10-CM    1. Kidney stone  N20.0 AMB POC URINALYSIS DIP STICK AUTO W/O MICRO     AMB POC XRAY ABDOMEN 1 VIEW      Tx options for right renal stone discussed including observation w KUB vs ESWL vs URS. After our discussion, the patient would like to proceed with right ESWL. Risks of ESWL that we discussed were bleeding, infection, flank pain, bruising, renal hematoma, injury to surrounding structures, incomplete fragmentation of stones, steinstrasse, inability to pass stone fragments requiring additional operative intervention in the form of ureteroscopy/laser lithotripsy and/or stent placement, renal failure, hypertension, risks of general anesthesia, recurrent stones. The patient expressed understanding of the above. Will follow up after procedure. Advised to call sooner if needed. Jose Tanner is supervising physician today and he approves plan of care. I have spent 32 minutes today reviewing previous notes, test results and face to face with the patient as well as documenting.

## 2022-10-27 ENCOUNTER — OFFICE VISIT (OUTPATIENT)
Dept: INTERNAL MEDICINE CLINIC | Facility: CLINIC | Age: 37
End: 2022-10-27
Payer: MEDICAID

## 2022-10-27 ENCOUNTER — TELEPHONE (OUTPATIENT)
Dept: UROLOGY | Age: 37
End: 2022-10-27

## 2022-10-27 VITALS
HEIGHT: 70 IN | WEIGHT: 264 LBS | BODY MASS INDEX: 37.8 KG/M2 | DIASTOLIC BLOOD PRESSURE: 60 MMHG | SYSTOLIC BLOOD PRESSURE: 110 MMHG

## 2022-10-27 DIAGNOSIS — R79.89 LOW SERUM VITAMIN D: ICD-10-CM

## 2022-10-27 DIAGNOSIS — M89.8X9 BONE PAIN: ICD-10-CM

## 2022-10-27 DIAGNOSIS — Z87.2 HISTORY OF SKIN CHANGES OF BREAST: ICD-10-CM

## 2022-10-27 DIAGNOSIS — N20.0 RIGHT KIDNEY STONE: ICD-10-CM

## 2022-10-27 DIAGNOSIS — E66.01 MORBID (SEVERE) OBESITY DUE TO EXCESS CALORIES (HCC): ICD-10-CM

## 2022-10-27 DIAGNOSIS — E83.52 HYPERCALCEMIA: Primary | ICD-10-CM

## 2022-10-27 DIAGNOSIS — E78.2 MIXED HYPERLIPIDEMIA: ICD-10-CM

## 2022-10-27 PROCEDURE — 99214 OFFICE O/P EST MOD 30 MIN: CPT | Performed by: NURSE PRACTITIONER

## 2022-10-27 PROCEDURE — 3078F DIAST BP <80 MM HG: CPT | Performed by: NURSE PRACTITIONER

## 2022-10-27 PROCEDURE — 3074F SYST BP LT 130 MM HG: CPT | Performed by: NURSE PRACTITIONER

## 2022-10-27 RX ORDER — CITALOPRAM 20 MG/1
20 TABLET ORAL DAILY
Qty: 30 TABLET | Refills: 5 | Status: SHIPPED | OUTPATIENT
Start: 2022-10-27

## 2022-10-27 ASSESSMENT — PATIENT HEALTH QUESTIONNAIRE - PHQ9
SUM OF ALL RESPONSES TO PHQ QUESTIONS 1-9: 0
SUM OF ALL RESPONSES TO PHQ QUESTIONS 1-9: 0
2. FEELING DOWN, DEPRESSED OR HOPELESS: 0
SUM OF ALL RESPONSES TO PHQ9 QUESTIONS 1 & 2: 0
SUM OF ALL RESPONSES TO PHQ QUESTIONS 1-9: 0
SUM OF ALL RESPONSES TO PHQ QUESTIONS 1-9: 0
1. LITTLE INTEREST OR PLEASURE IN DOING THINGS: 0

## 2022-10-27 ASSESSMENT — ANXIETY QUESTIONNAIRES: 2. NOT BEING ABLE TO STOP OR CONTROL WORRYING: 2

## 2022-10-27 NOTE — TELEPHONE ENCOUNTER
----- Message from EPHRAIM Arizmendi CNP sent at 10/26/2022 11:18 AM EDT -----  Needs R ESWL in next 2-3 weeks.  No BT.

## 2022-10-27 NOTE — PROGRESS NOTES
Pauline Lorenzo (:  1985) is a 44 y.o. female,Established patient, here for evaluation of the following chief complaint(s):  Follow-up and Discuss Labs         ASSESSMENT/PLAN:  1. Hypercalcemia  -     Lipid Panel; Future  -     TSH; Future  -     Comprehensive Metabolic Panel; Future  -     CBC; Future  -     Vitamin D 25 Hydroxy; Future  -     Electrophoresis Protein, Serum; Future  -     YEE and PE, Serum; Future  -     Kappa/Lambda Quantitative Free Light Chains, Serum; Future  2. Low serum vitamin D  3. Right kidney stone  4. Morbid (severe) obesity due to excess calories (HCC)  -     TSH; Future  5. Mixed hyperlipidemia  -     Lipid Panel; Future  6. Bone pain  -     Electrophoresis Protein, Serum; Future  -     YEE and PE, Serum; Future  -     Kappa/Lambda Quantitative Free Light Chains, Serum; Future  -     Sedimentation Rate; Future  -     C-Reactive Protein; Future  7. History of skin changes of breast  -     [facility] DIGITAL DIAGNOSTIC W OR WO CAD BILATERAL; Future  -     US BREAST COMPLETE LEFT; Future      No follow-ups on file. Subjective   SUBJECTIVE/OBJECTIVE:  Patient is here for follow up. She is taking 10mg of celexa. She still has waves of anxiety. She has a different generic of amlodipine but feels that she is more lightheaded on the new generic. She has appt with endo f/u next week. She still feels bone pain, fatigue, achiness, and feels bad. She saw ENT but still feels left sided facial fullness. She has looked over her labs and trended her results and that caused great stress and panic and anxiety. She had to put her notebook away and stop looking at lab trends because it caused stress.   She saw nephrology and is planning to get stone treated    She would like a mammogram. Her left breast has always been larger but lately is seeming to increase in size and she has seen a purple streak on her breast. She has concern over breast malignancy and would like mammogram.    Review of Systems       Objective   Physical Exam  Constitutional:       Appearance: Normal appearance. She is obese. HENT:      Head: Normocephalic. Right Ear: External ear normal.      Left Ear: External ear normal.   Eyes:      Extraocular Movements: Extraocular movements intact. Pupils: Pupils are equal, round, and reactive to light. Cardiovascular:      Rate and Rhythm: Normal rate and regular rhythm. Pulmonary:      Effort: Pulmonary effort is normal.      Breath sounds: Normal breath sounds. No wheezing or rhonchi. Musculoskeletal:      Cervical back: Neck supple. Skin:     General: Skin is warm and dry. Neurological:      General: No focal deficit present. Mental Status: She is alert and oriented to person, place, and time. Psychiatric:         Mood and Affect: Mood normal.         Behavior: Behavior normal.                An electronic signature was used to authenticate this note.     --EPHRAIM Dowd - CNP

## 2022-10-28 ENCOUNTER — NURSE ONLY (OUTPATIENT)
Dept: INTERNAL MEDICINE CLINIC | Facility: CLINIC | Age: 37
End: 2022-10-28

## 2022-10-28 DIAGNOSIS — E78.2 MIXED HYPERLIPIDEMIA: ICD-10-CM

## 2022-10-28 DIAGNOSIS — M89.8X9 BONE PAIN: ICD-10-CM

## 2022-10-28 DIAGNOSIS — N20.0 KIDNEY STONE: Primary | ICD-10-CM

## 2022-10-28 DIAGNOSIS — E83.52 HYPERCALCEMIA: ICD-10-CM

## 2022-10-28 DIAGNOSIS — E66.01 MORBID (SEVERE) OBESITY DUE TO EXCESS CALORIES (HCC): ICD-10-CM

## 2022-10-28 LAB
25(OH)D3 SERPL-MCNC: 19.8 NG/ML (ref 30–100)
ALBUMIN SERPL-MCNC: 4 G/DL (ref 3.5–5)
ALBUMIN/GLOB SERPL: 1.4 {RATIO} (ref 0.4–1.6)
ALP SERPL-CCNC: 88 U/L (ref 50–136)
ALT SERPL-CCNC: 20 U/L (ref 12–65)
ANION GAP SERPL CALC-SCNC: 4 MMOL/L (ref 2–11)
AST SERPL-CCNC: 11 U/L (ref 15–37)
BILIRUB SERPL-MCNC: 0.3 MG/DL (ref 0.2–1.1)
BUN SERPL-MCNC: 12 MG/DL (ref 6–23)
CALCIUM SERPL-MCNC: 9.9 MG/DL (ref 8.3–10.4)
CHLORIDE SERPL-SCNC: 108 MMOL/L (ref 101–110)
CHOLEST SERPL-MCNC: 207 MG/DL
CO2 SERPL-SCNC: 27 MMOL/L (ref 21–32)
CREAT SERPL-MCNC: 0.6 MG/DL (ref 0.6–1)
CRP SERPL-MCNC: 0.7 MG/DL (ref 0–0.9)
ERYTHROCYTE [DISTWIDTH] IN BLOOD BY AUTOMATED COUNT: 12.3 % (ref 11.9–14.6)
ERYTHROCYTE [SEDIMENTATION RATE] IN BLOOD: 21 MM/HR (ref 0–20)
GLOBULIN SER CALC-MCNC: 2.9 G/DL (ref 2.8–4.5)
GLUCOSE SERPL-MCNC: 103 MG/DL (ref 65–100)
HCT VFR BLD AUTO: 42.1 % (ref 35.8–46.3)
HDLC SERPL-MCNC: 43 MG/DL (ref 40–60)
HDLC SERPL: 4.8 {RATIO}
HGB BLD-MCNC: 13.2 G/DL (ref 11.7–15.4)
LDLC SERPL CALC-MCNC: 141.4 MG/DL
MCH RBC QN AUTO: 29.4 PG (ref 26.1–32.9)
MCHC RBC AUTO-ENTMCNC: 31.4 G/DL (ref 31.4–35)
MCV RBC AUTO: 93.8 FL (ref 82–102)
NRBC # BLD: 0 K/UL (ref 0–0.2)
PLATELET # BLD AUTO: 212 K/UL (ref 150–450)
PMV BLD AUTO: 11.5 FL (ref 9.4–12.3)
POTASSIUM SERPL-SCNC: 4.2 MMOL/L (ref 3.5–5.1)
PROT SERPL-MCNC: 6.9 G/DL (ref 6.3–8.2)
RBC # BLD AUTO: 4.49 M/UL (ref 4.05–5.2)
SODIUM SERPL-SCNC: 139 MMOL/L (ref 133–143)
TRIGL SERPL-MCNC: 113 MG/DL (ref 35–150)
TSH, 3RD GENERATION: 1.14 UIU/ML (ref 0.36–3.74)
VLDLC SERPL CALC-MCNC: 22.6 MG/DL (ref 6–23)
WBC # BLD AUTO: 6.9 K/UL (ref 4.3–11.1)

## 2022-10-31 LAB
KAPPA LC FREE SER-MCNC: 16.6 MG/L (ref 3.3–19.4)
KAPPA LC FREE/LAMBDA FREE SER: 1.51 {RATIO} (ref 0.26–1.65)
LAMBDA LC FREE SERPL-MCNC: 11 MG/L (ref 5.7–26.3)

## 2022-11-01 LAB
ALBUMIN SERPL ELPH-MCNC: 3.6 G/DL (ref 2.9–4.4)
ALBUMIN/GLOB SERPL: 1.2 {RATIO} (ref 0.7–1.7)
ALPHA1 GLOB SERPL ELPH-MCNC: 0.2 G/DL (ref 0–0.4)
ALPHA2 GLOB SERPL ELPH-MCNC: 0.8 G/DL (ref 0.4–1)
B-GLOBULIN SERPL ELPH-MCNC: 1 G/DL (ref 0.7–1.3)
GAMMA GLOB SERPL ELPH-MCNC: 0.9 G/DL (ref 0.4–1.8)
GLOBULIN SER CALC-MCNC: 2.9 G/DL (ref 2.2–3.9)
M PROTEIN SERPL ELPH-MCNC: NORMAL G/DL
PROT SERPL-MCNC: 6.5 G/DL (ref 6–8.5)

## 2022-11-02 LAB
ALBUMIN SERPL ELPH-MCNC: 3.8 G/DL (ref 2.9–4.4)
ALBUMIN/GLOB SERPL: 1.5 {RATIO} (ref 0.7–1.7)
ALPHA1 GLOB SERPL ELPH-MCNC: 0.2 G/DL (ref 0–0.4)
ALPHA2 GLOB SERPL ELPH-MCNC: 0.7 G/DL (ref 0.4–1)
B-GLOBULIN SERPL ELPH-MCNC: 0.9 G/DL (ref 0.7–1.3)
GAMMA GLOB SERPL ELPH-MCNC: 0.8 G/DL (ref 0.4–1.8)
GLOBULIN SER-MCNC: 2.7 G/DL (ref 2.2–3.9)
IGA SERPL-MCNC: 154 MG/DL (ref 87–352)
IGG SERPL-MCNC: 878 MG/DL (ref 586–1602)
IGM SERPL-MCNC: 100 MG/DL (ref 26–217)
INTERPRETATION SERPL IEP-IMP: NORMAL
M PROTEIN SERPL ELPH-MCNC: NORMAL G/DL
PROT SERPL-MCNC: 6.5 G/DL (ref 6–8.5)

## 2022-11-02 RX ORDER — IBUPROFEN 400 MG/1
400 TABLET ORAL EVERY 6 HOURS PRN
COMMUNITY

## 2022-11-02 RX ORDER — ACETAMINOPHEN 500 MG
1000 TABLET ORAL EVERY 6 HOURS PRN
COMMUNITY

## 2022-11-02 NOTE — PERIOP NOTE
Patient verified name and . Order for consent  found in EHR and matches case posting; patient verifies procedure. Type 1B surgery, phone assessment complete. Orders  received. Labs per surgeon: CBC, UA s/h for DOS; UA- WDL on 10/26/22, CBC - WDL 10/28/22   Labs per anesthesia protocol: K+ 4.2 on 10/28/22    Patient answered medical/surgical history questions at their best of ability. All prior to admission medications documented in [facility]. Patient instructed to take the following medications the day of surgery according to anesthesia guidelines with a small sip of water: amlodipine. On the day before surgery please take Acetaminophen 1000mg in the morning and then again before bed. You may substitute for Tylenol 650 mg. Hold all vitamins and supplements now for surgery and NSAIDS (ibuprofen) now for surgery. Prescription meds to hold: hold suboxone the morning of surgery    Patient instructed on the following:    > Arrive at main Entrance, time of arrival to be called the day before by 1700  > NPO after midnight, unless otherwise indicated, including gum, mints, and ice chips  > Responsible adult must drive patient to the hospital, stay during surgery, and patient will need supervision 24 hours after anesthesia  > Use antibacterial soap in shower the night before surgery and on the morning of surgery  > All piercings must be removed prior to arrival.    > Leave all valuables (money and jewelry) at home but bring insurance card and ID on DOS.   > You may be required to pay a deductible or co-pay on the day of your procedure. You can pre-pay by calling [phone] if your surgery is at the [facility] or [phone] if your surgery is at the [facility]. > Do not wear make-up, nail polish, lotions, cologne, perfumes, powders, or oil on skin. Artificial nails are not permitted.      Patient verbalized understanding

## 2022-11-03 ENCOUNTER — ANESTHESIA EVENT (OUTPATIENT)
Dept: SURGERY | Age: 37
End: 2022-11-03
Payer: MEDICAID

## 2022-11-04 ENCOUNTER — ANESTHESIA (OUTPATIENT)
Dept: SURGERY | Age: 37
End: 2022-11-04
Payer: MEDICAID

## 2022-11-04 ENCOUNTER — HOSPITAL ENCOUNTER (OUTPATIENT)
Age: 37
Setting detail: OUTPATIENT SURGERY
Discharge: HOME | End: 2022-11-04
Attending: UROLOGY | Admitting: UROLOGY
Payer: MEDICAID

## 2022-11-04 VITALS
DIASTOLIC BLOOD PRESSURE: 68 MMHG | HEIGHT: 69 IN | WEIGHT: 263 LBS | BODY MASS INDEX: 38.95 KG/M2 | OXYGEN SATURATION: 96 % | RESPIRATION RATE: 16 BRPM | SYSTOLIC BLOOD PRESSURE: 126 MMHG | HEART RATE: 81 BPM | TEMPERATURE: 98.5 F

## 2022-11-04 DIAGNOSIS — N20.0 KIDNEY STONE: ICD-10-CM

## 2022-11-04 PROCEDURE — 2500000003 HC RX 250 WO HCPCS

## 2022-11-04 PROCEDURE — 6360000002 HC RX W HCPCS: Performed by: ANESTHESIOLOGY

## 2022-11-04 PROCEDURE — 6360000002 HC RX W HCPCS

## 2022-11-04 PROCEDURE — 2709999900 HC NON-CHARGEABLE SUPPLY: Performed by: UROLOGY

## 2022-11-04 PROCEDURE — 7100000001 HC PACU RECOVERY - ADDTL 15 MIN: Performed by: UROLOGY

## 2022-11-04 PROCEDURE — 3600000005 HC SURGERY LEVEL 5 BASE: Performed by: UROLOGY

## 2022-11-04 PROCEDURE — 50590 FRAGMENTING OF KIDNEY STONE: CPT | Performed by: UROLOGY

## 2022-11-04 PROCEDURE — 7100000010 HC PHASE II RECOVERY - FIRST 15 MIN: Performed by: UROLOGY

## 2022-11-04 PROCEDURE — 7100000000 HC PACU RECOVERY - FIRST 15 MIN: Performed by: UROLOGY

## 2022-11-04 PROCEDURE — 7100000011 HC PHASE II RECOVERY - ADDTL 15 MIN: Performed by: UROLOGY

## 2022-11-04 PROCEDURE — 6360000002 HC RX W HCPCS: Performed by: NURSE PRACTITIONER

## 2022-11-04 PROCEDURE — 3700000001 HC ADD 15 MINUTES (ANESTHESIA): Performed by: UROLOGY

## 2022-11-04 PROCEDURE — 2580000003 HC RX 258: Performed by: ANESTHESIOLOGY

## 2022-11-04 PROCEDURE — 3600000015 HC SURGERY LEVEL 5 ADDTL 15MIN: Performed by: UROLOGY

## 2022-11-04 PROCEDURE — 3700000000 HC ANESTHESIA ATTENDED CARE: Performed by: UROLOGY

## 2022-11-04 RX ORDER — ONDANSETRON 2 MG/ML
INJECTION INTRAMUSCULAR; INTRAVENOUS PRN
Status: DISCONTINUED | OUTPATIENT
Start: 2022-11-04 | End: 2022-11-04 | Stop reason: SDUPTHER

## 2022-11-04 RX ORDER — MIDAZOLAM HYDROCHLORIDE 2 MG/2ML
2 INJECTION, SOLUTION INTRAMUSCULAR; INTRAVENOUS
Status: COMPLETED | OUTPATIENT
Start: 2022-11-04 | End: 2022-11-04

## 2022-11-04 RX ORDER — KETOROLAC TROMETHAMINE 30 MG/ML
15 INJECTION, SOLUTION INTRAMUSCULAR; INTRAVENOUS ONCE AS NEEDED
Status: COMPLETED | OUTPATIENT
Start: 2022-11-04 | End: 2022-11-04

## 2022-11-04 RX ORDER — FENTANYL CITRATE 50 UG/ML
100 INJECTION, SOLUTION INTRAMUSCULAR; INTRAVENOUS
Status: DISCONTINUED | OUTPATIENT
Start: 2022-11-04 | End: 2022-11-04 | Stop reason: HOSPADM

## 2022-11-04 RX ORDER — OXYCODONE HYDROCHLORIDE 5 MG/1
10 TABLET ORAL PRN
Status: DISCONTINUED | OUTPATIENT
Start: 2022-11-04 | End: 2022-11-04 | Stop reason: HOSPADM

## 2022-11-04 RX ORDER — SODIUM CHLORIDE, SODIUM LACTATE, POTASSIUM CHLORIDE, CALCIUM CHLORIDE 600; 310; 30; 20 MG/100ML; MG/100ML; MG/100ML; MG/100ML
INJECTION, SOLUTION INTRAVENOUS CONTINUOUS
Status: DISCONTINUED | OUTPATIENT
Start: 2022-11-04 | End: 2022-11-04 | Stop reason: HOSPADM

## 2022-11-04 RX ORDER — OXYCODONE HYDROCHLORIDE 5 MG/1
5 TABLET ORAL PRN
Status: DISCONTINUED | OUTPATIENT
Start: 2022-11-04 | End: 2022-11-04 | Stop reason: HOSPADM

## 2022-11-04 RX ORDER — LIDOCAINE HYDROCHLORIDE 20 MG/ML
INJECTION, SOLUTION EPIDURAL; INFILTRATION; INTRACAUDAL; PERINEURAL PRN
Status: DISCONTINUED | OUTPATIENT
Start: 2022-11-04 | End: 2022-11-04 | Stop reason: SDUPTHER

## 2022-11-04 RX ORDER — HYDROMORPHONE HYDROCHLORIDE 2 MG/ML
0.5 INJECTION, SOLUTION INTRAMUSCULAR; INTRAVENOUS; SUBCUTANEOUS EVERY 5 MIN PRN
Status: DISCONTINUED | OUTPATIENT
Start: 2022-11-04 | End: 2022-11-04 | Stop reason: HOSPADM

## 2022-11-04 RX ORDER — ACETAMINOPHEN 500 MG
1000 TABLET ORAL ONCE
Status: DISCONTINUED | OUTPATIENT
Start: 2022-11-04 | End: 2022-11-04 | Stop reason: HOSPADM

## 2022-11-04 RX ORDER — SODIUM CHLORIDE 0.9 % (FLUSH) 0.9 %
5-40 SYRINGE (ML) INJECTION PRN
Status: DISCONTINUED | OUTPATIENT
Start: 2022-11-04 | End: 2022-11-04 | Stop reason: HOSPADM

## 2022-11-04 RX ORDER — DIPHENHYDRAMINE HYDROCHLORIDE 50 MG/ML
12.5 INJECTION INTRAMUSCULAR; INTRAVENOUS
Status: DISCONTINUED | OUTPATIENT
Start: 2022-11-04 | End: 2022-11-04 | Stop reason: HOSPADM

## 2022-11-04 RX ORDER — DEXAMETHASONE SODIUM PHOSPHATE 10 MG/ML
INJECTION INTRAMUSCULAR; INTRAVENOUS PRN
Status: DISCONTINUED | OUTPATIENT
Start: 2022-11-04 | End: 2022-11-04 | Stop reason: SDUPTHER

## 2022-11-04 RX ORDER — PROPOFOL 10 MG/ML
INJECTION, EMULSION INTRAVENOUS PRN
Status: DISCONTINUED | OUTPATIENT
Start: 2022-11-04 | End: 2022-11-04 | Stop reason: SDUPTHER

## 2022-11-04 RX ORDER — SODIUM CHLORIDE 0.9 % (FLUSH) 0.9 %
5-40 SYRINGE (ML) INJECTION EVERY 12 HOURS SCHEDULED
Status: DISCONTINUED | OUTPATIENT
Start: 2022-11-04 | End: 2022-11-04 | Stop reason: HOSPADM

## 2022-11-04 RX ORDER — ONDANSETRON 2 MG/ML
4 INJECTION INTRAMUSCULAR; INTRAVENOUS
Status: DISCONTINUED | OUTPATIENT
Start: 2022-11-04 | End: 2022-11-04 | Stop reason: HOSPADM

## 2022-11-04 RX ADMIN — ONDANSETRON 4 MG: 2 INJECTION INTRAMUSCULAR; INTRAVENOUS at 10:20

## 2022-11-04 RX ADMIN — SODIUM CHLORIDE, POTASSIUM CHLORIDE, SODIUM LACTATE AND CALCIUM CHLORIDE: 600; 310; 30; 20 INJECTION, SOLUTION INTRAVENOUS at 09:19

## 2022-11-04 RX ADMIN — PROPOFOL 250 MG: 10 INJECTION, EMULSION INTRAVENOUS at 10:17

## 2022-11-04 RX ADMIN — Medication 2000 MG: at 10:17

## 2022-11-04 RX ADMIN — MIDAZOLAM 2 MG: 1 INJECTION INTRAMUSCULAR; INTRAVENOUS at 10:07

## 2022-11-04 RX ADMIN — KETOROLAC TROMETHAMINE 15 MG: 30 INJECTION, SOLUTION INTRAMUSCULAR at 11:20

## 2022-11-04 RX ADMIN — LIDOCAINE HYDROCHLORIDE 60 MG: 20 INJECTION, SOLUTION EPIDURAL; INFILTRATION; INTRACAUDAL; PERINEURAL at 10:17

## 2022-11-04 RX ADMIN — DEXAMETHASONE SODIUM PHOSPHATE 4 MG: 10 INJECTION INTRAMUSCULAR; INTRAVENOUS at 10:20

## 2022-11-04 ASSESSMENT — PAIN SCALES - GENERAL: PAINLEVEL_OUTOF10: 6

## 2022-11-04 ASSESSMENT — PAIN - FUNCTIONAL ASSESSMENT
PAIN_FUNCTIONAL_ASSESSMENT: 0-10
PAIN_FUNCTIONAL_ASSESSMENT: 0-10
PAIN_FUNCTIONAL_ASSESSMENT: WONG-BAKER FACES

## 2022-11-04 ASSESSMENT — PAIN DESCRIPTION - LOCATION: LOCATION: FLANK

## 2022-11-04 ASSESSMENT — PAIN DESCRIPTION - DESCRIPTORS: DESCRIPTORS: ACHING;THROBBING

## 2022-11-04 NOTE — PROGRESS NOTES
Spiritual Care Visit. Initial visit. Patient was visited by [facility]. Entered by Donya Yanes, Staff .  Jaye, Sarahy.

## 2022-11-04 NOTE — ANESTHESIA PRE PROCEDURE
Department of Anesthesiology  Preprocedure Note       Name:  Inge Parker   Age:  44 y.o.  :  1985                                          MRN:  [id number]         Date:  2022      Surgeon: Clara May):  Estuardo Mckay MD    Procedure: Procedure(s):  ESWL EXTRACORPOREAL SHOCK WAVE LITHOTRIPSY/RIGHT    Medications prior to admission:   Prior to Admission medications    Medication Sig Start Date End Date Taking? Authorizing Provider   ibuprofen (ADVIL;MOTRIN) 400 MG tablet Take 400 mg by mouth every 6 hours as needed for Pain   Yes Historical Provider, MD   acetaminophen (TYLENOL) 500 MG tablet Take 1,000 mg by mouth every 6 hours as needed for Pain   Yes Historical Provider, MD   citalopram (CELEXA) 20 MG tablet Take 1 tablet by mouth daily  Patient taking differently: Take 20 mg by mouth nightly 10/27/22   Rossung Alvarez, APRN - CNP   amLODIPine (NORVASC) 10 MG tablet Take 1 tablet by mouth daily 22   Curt Alvarez APRN - CNP   buprenorphine-naloxone (SUBOXONE) 8-2 MG SUBL SL tablet daily.  3/21/22   Historical Provider, MD       Current medications:    Current Facility-Administered Medications   Medication Dose Route Frequency Provider Last Rate Last Admin    acetaminophen (TYLENOL) tablet 1,000 mg  1,000 mg Oral Once Kyree Gerardo MD        fentaNYL (SUBLIMAZE) injection 100 mcg  100 mcg IntraVENous Once PRN Kyree Gerardo MD        lactated ringers infusion   IntraVENous Continuous Kyree Gerardo  mL/hr at 22 0955 NoRateChange at 22 0955    sodium chloride flush 0.9 % injection 5-40 mL  5-40 mL IntraVENous 2 times per day Kyree Gerardo MD        sodium chloride flush 0.9 % injection 5-40 mL  5-40 mL IntraVENous PRN Kyree Gerardo MD        midazolam PF (VERSED) injection 2 mg  2 mg IntraVENous Once PRN Kyree Gerardo MD        ceFAZolin (ANCEF) 2000 mg in sterile water 20 mL IV syringe  2,000 mg IntraVENous On Call to OR Lora Colbert APRN - CNP           Allergies:     Allergies   Allergen Reactions    Penicillins Rash       Problem List:    Patient Active Problem List   Diagnosis Code    Opioid dependence with opioid-induced mood disorder (HCC) F11.24    Class 2 severe obesity due to excess calories with serious comorbidity and body mass index (BMI) of 37.0 to 37.9 in adult ([facility] E66.01, Z68.37    Elevated glucose R73.09    Low serum vitamin D R79.89    Mixed hyperlipidemia E78.2    Hypercalcemia E83.52    Anxiety F41.9    Primary hypertension I10    Kidney stone N20.0       Past Medical History:        Diagnosis Date    Anxiety     panic attacks- medication    Calculus of kidney     Chronic kidney disease     kidney stones    Hx of substance abuse ([facility] 2020    meth and heoin    Hyperlipidemia     Hypertension     medication    Other ill-defined conditions(799.89)     Yearly sinus       Past Surgical History:        Procedure Laterality Date     SECTION      x2    HEENT      tonsils    TUBAL LIGATION         Social History:    Social History     Tobacco Use    Smoking status: Every Day     Packs/day: 0.50     Types: Cigarettes    Smokeless tobacco: Never   Substance Use Topics    Alcohol use: Not Currently                                Ready to quit: Not Answered  Counseling given: Not Answered      Vital Signs (Current):   Vitals:    22 0920 22 0900   BP:  129/77   Pulse:  75   Resp:  16   Temp:  98.6 °F (37 °C)   TempSrc:  Oral   SpO2:  99%   Weight: 250 lb (113.4 kg) 263 lb (119.3 kg)   Height: 5' 9\" (1.753 m) 5' 9\" (1.753 m)                                              BP Readings from Last 3 Encounters:   22 129/77   10/27/22 110/60   22 120/80       NPO Status: Time of last liquid consumption: 0000                        Time of last solid consumption: 0000                        Date of last liquid consumption: 22                        Date of last solid food consumption: 11/03/22    BMI:   Wt Readings from Last 3 Encounters:   11/04/22 263 lb (119.3 kg)   10/27/22 264 lb (119.7 kg)   06/08/22 261 lb (118.4 kg)     Body mass index is 38.84 kg/m². CBC:   Lab Results   Component Value Date/Time    WBC 6.9 10/28/2022 10:46 AM    RBC 4.49 10/28/2022 10:46 AM    HGB 13.2 10/28/2022 10:46 AM    HCT 42.1 10/28/2022 10:46 AM    MCV 93.8 10/28/2022 10:46 AM    RDW 12.3 10/28/2022 10:46 AM     10/28/2022 10:46 AM       CMP:   Lab Results   Component Value Date/Time     10/28/2022 10:46 AM    K 4.2 10/28/2022 10:46 AM     10/28/2022 10:46 AM    CO2 27 10/28/2022 10:46 AM    BUN 12 10/28/2022 10:46 AM    CREATININE 0.60 10/28/2022 10:46 AM    GFRAA >60 08/08/2022 08:28 AM    AGRATIO 2.0 04/06/2022 02:35 PM    LABGLOM >60 10/28/2022 10:46 AM    GLUCOSE 103 10/28/2022 10:46 AM    PROT 6.9 10/28/2022 10:46 AM    PROT 6.5 10/28/2022 10:46 AM    PROT 6.5 10/28/2022 10:46 AM    CALCIUM 9.9 10/28/2022 10:46 AM    BILITOT 0.3 10/28/2022 10:46 AM    ALKPHOS 88 10/28/2022 10:46 AM    ALKPHOS 75 04/06/2022 02:35 PM    AST 11 10/28/2022 10:46 AM    ALT 20 10/28/2022 10:46 AM       POC Tests: No results for input(s): POCGLU, POCNA, POCK, POCCL, POCBUN, POCHEMO, POCHCT in the last 72 hours.     Coags: No results found for: PROTIME, INR, APTT    HCG (If Applicable): No results found for: PREGTESTUR, PREGSERUM, HCG, HCGQUANT     ABGs: No results found for: PHART, PO2ART, HVY1RQH, FJB3MHS, BEART, E3JIZVKT     Type & Screen (If Applicable):  No results found for: LABABO, LABRH    Drug/Infectious Status (If Applicable):  No results found for: HIV, HEPCAB    COVID-19 Screening (If Applicable): No results found for: COVID19        Anesthesia Evaluation  Patient summary reviewed and Nursing notes reviewed  Airway: Mallampati: II  TM distance: >3 FB   Neck ROM: full  Mouth opening: > = 3 FB   Dental: normal exam         Pulmonary:Negative Pulmonary ROS and normal exam  breath sounds clear to auscultation                             Cardiovascular:  Exercise tolerance: good (>4 METS),   (+) hypertension:,         Rhythm: regular  Rate: normal                    Neuro/Psych:   Negative Neuro/Psych ROS  (+) psychiatric history:            GI/Hepatic/Renal:   (+) morbid obesity          Endo/Other: Negative Endo/Other ROS                    Abdominal:             Vascular: negative vascular ROS. Other Findings:           Anesthesia Plan      general     ASA 3     (On Suboxone)  Induction: intravenous. Anesthetic plan and risks discussed with patient.                         Jairo Love MD   11/4/2022

## 2022-11-04 NOTE — PERIOP NOTE
Patient has spoken with [facility] TONIE. Consent has been verified, signed and witnessed by this RN. 2 mg of Versed given SIVP per orders. Pulse ox monitor in place & tracing appropriately. Bed in low, locked position with side rails up x 2 and call light in reach. Instructed pt to call with any needs and to remain in bed. Verbalizes understanding. Winter at bedside.

## 2022-11-04 NOTE — OP NOTE
Operative Note                Patient: Cristo Aguilar, [id number]    Date of Surgery: 11/04/22    Preoperative Diagnosis: 7mm RLP renal stone    Postoperative Diagnosis:  same    Surgeon(s) and Role:     * Ary Bar MD - Primary     Anesthesia:  General     Procedure: Procedure(s):  right EXTRACORPORAL SHOCKWAVE LITHOTRIPSY (ESWL)     Indications:     Discussed the risk of surgery including infection, hematoma, bleeding, recurrence or persistence of symptoms or stone, and the risks of general anesthetic. The patient understands the risks, any and all questions were answered to the patient's satisfaction and they freely signed the consent for operation. Procedure in Detail:  Patient was taken to the lithotripsy suite. Anesthesia was induced via the anesthesiology service. Using flouroscopy for guidance, a total of 3000 shocks were delivered in a non-gaited fashion. No cardiac ectopy was experienced. Shock rate was begun at 60 shocks per minute and then increased to 90 shocks per minute. Energy level was begun at 7 and gradually increased to a maximum of 11. Findings: Patient tolerated the procedure well. At the end of the procedure, the stone appeared to have fractured.       Estimated Blood Loss:  none    Specimens: * No specimens in log *             Drains: none                 Implants: * No implants in log *           Signed By: Kavin Todd MD

## 2022-11-04 NOTE — PERIOP NOTE
Pt and sister Vu Jones given discharge instructions, both verbalize understanding. All questions answered.

## 2022-11-04 NOTE — INTERVAL H&P NOTE
Update History & Physical    The patient's History and Physical of October 26, 2022 was reviewed with the patient and I examined the patient. There was no change. The surgical site was confirmed by the patient and me. Plan: The risks, benefits, expected outcome, and alternative to the recommended procedure have been discussed with the patient. Patient understands and wants to proceed with the procedure.      Electronically signed by Linette Chavarria MD on 11/4/2022 at 8:41 AM

## 2022-11-04 NOTE — ANESTHESIA PROCEDURE NOTES
Airway  Date/Time: 11/4/2022 10:17 AM  Urgency: elective    Airway not difficult    General Information and Staff    Patient location during procedure: OR  Performed: resident/CRNA     Indications and Patient Condition  Indications for airway management: anesthesia  Spontaneous ventilation: present  Sedation level: deep  Preoxygenated: yes  Patient position: sniffing  MILS not maintained throughout  Mask difficulty assessment: vent by bag mask    Final Airway Details  Final airway type: supraglottic airway      Successful airway: oropharyngeal  Size 4     Number of attempts at approach: 1  Ventilation between attempts: supraglottic airway  Number of other approaches attempted: 0    Additional Comments  atraumatic  no

## 2022-11-04 NOTE — ANESTHESIA POSTPROCEDURE EVALUATION
Department of Anesthesiology  Postprocedure Note    Patient: Mark Jerez  MRN: [id number]  YOB: 1985  Date of evaluation: 11/4/2022      Procedure Summary     Date: 11/04/22 Room / Location: [facility] MAIN OR 02 / [facility] MAIN OR    Anesthesia Start: 1005 Anesthesia Stop: 1107    Procedure: ESWL EXTRACORPOREAL SHOCK WAVE LITHOTRIPSY/RIGHT (Right: Kidney) Diagnosis:       Kidney stone      (Kidney stone [N20.0])    Providers: Lord Sarah MD Responsible Provider: Rashi Major MD    Anesthesia Type: general ASA Status: 3          Anesthesia Type: No value filed.     Mi Phase I: Mi Score: 9    Mi Phase II: Mi Score: 10      Anesthesia Post Evaluation    Patient location during evaluation: PACU  Patient participation: complete - patient participated  Level of consciousness: awake and alert  Airway patency: patent  Nausea & Vomiting: no nausea and no vomiting  Complications: no  Cardiovascular status: hemodynamically stable  Respiratory status: acceptable, nonlabored ventilation and spontaneous ventilation  Hydration status: euvolemic  Comments: /68   Pulse 81   Temp 98.5 °F (36.9 °C) (Temporal)   Resp 16   Ht 5' 9\" (1.753 m)   Wt 263 lb (119.3 kg)   SpO2 96%   BMI 38.84 kg/m²     Multimodal analgesia pain management approach

## 2022-11-18 ENCOUNTER — OFFICE VISIT (OUTPATIENT)
Dept: UROLOGY | Age: 37
End: 2022-11-18
Payer: MEDICAID

## 2022-11-18 DIAGNOSIS — N20.0 KIDNEY STONE: Primary | ICD-10-CM

## 2022-11-18 LAB
BILIRUBIN, URINE, POC: NEGATIVE
BLOOD URINE, POC: NEGATIVE
GLUCOSE URINE, POC: NEGATIVE
KETONES, URINE, POC: NEGATIVE
LEUKOCYTE ESTERASE, URINE, POC: NEGATIVE
NITRITE, URINE, POC: NEGATIVE
PH, URINE, POC: 7 (ref 4.6–8)
PROTEIN,URINE, POC: NEGATIVE
SPECIFIC GRAVITY, URINE, POC: 1.02 (ref 1–1.03)
URINALYSIS CLARITY, POC: ABNORMAL
URINALYSIS COLOR, POC: ABNORMAL
UROBILINOGEN, POC: ABNORMAL

## 2022-11-18 PROCEDURE — 74018 RADEX ABDOMEN 1 VIEW: CPT | Performed by: NURSE PRACTITIONER

## 2022-11-18 PROCEDURE — 81003 URINALYSIS AUTO W/O SCOPE: CPT | Performed by: NURSE PRACTITIONER

## 2022-11-18 RX ORDER — POTASSIUM CITRATE 5 MEQ/1
5 TABLET, EXTENDED RELEASE ORAL 2 TIMES DAILY WITH MEALS
Qty: 60 TABLET | Refills: 3 | Status: SHIPPED | OUTPATIENT
Start: 2022-11-18

## 2022-11-18 RX ORDER — TAMSULOSIN HYDROCHLORIDE 0.4 MG/1
0.4 CAPSULE ORAL DAILY
Qty: 7 CAPSULE | Refills: 0 | Status: SHIPPED | OUTPATIENT
Start: 2022-11-18

## 2022-11-18 NOTE — PROGRESS NOTES
[facility]  [address] [facility]    Thom Heaps [address], [address] [facility]  [phone]          Kiana Tidwell  : 1985    Chief Complaint   Patient presents with    Follow-up     Post op lithotripsy           HPI     Kiana Tidwell is a 44 y.o. female being seen today for post op visit. CT abd pelvis wo contrast completed revealing a 7 mm non obstructing RLP stone. She is s/p R ESWL on 22. Reports she is passing small fragments. She brings fragments into office today. Int right flank pain. Afebrile. KUB in office today reviewed by myself and shows no stone. Followed by endocrinology for hypercalcemia. Ca was normal w last blood draw. 24 hr urine done in Aug by endo revealed output 1850 cc-normal calcium. Cr 0.60. No BT.      Past Medical History:   Diagnosis Date    Anxiety     panic attacks- medication    Calculus of kidney     Chronic kidney disease     kidney stones    Hx of substance abuse ([address]     meth and heoin    Hyperlipidemia     Hypertension     medication    Other ill-defined conditions(799.89)     Yearly sinus     Past Surgical History:   Procedure Laterality Date     SECTION      x2    HEENT      tonsils    LITHOTRIPSY Right 2022    ESWL EXTRACORPOREAL SHOCK WAVE LITHOTRIPSY/RIGHT performed by Amando Pizarro MD at [facility] MAIN OR    TUBAL LIGATION       Current Outpatient Medications   Medication Sig Dispense Refill    tamsulosin (FLOMAX) 0.4 MG capsule Take 1 capsule by mouth daily 7 capsule 0    potassium citrate (UROCIT-K 5) 5 MEQ (540 MG) extended release tablet Take 1 tablet by mouth 2 times daily (with meals) 60 tablet 3    ibuprofen (ADVIL;MOTRIN) 400 MG tablet Take 400 mg by mouth every 6 hours as needed for Pain      acetaminophen (TYLENOL) 500 MG tablet Take 1,000 mg by mouth every 6 hours as needed for Pain      citalopram (CELEXA) 20 MG tablet Take 1 tablet by mouth daily (Patient taking differently: Take 20 mg by mouth nightly) 30 tablet 5    amLODIPine (NORVASC) 10 MG tablet Take 1 tablet by mouth daily 30 tablet 4    buprenorphine-naloxone (SUBOXONE) 8-2 MG SUBL SL tablet daily. No current facility-administered medications for this visit.      Allergies   Allergen Reactions    Penicillins Rash     Social History     Socioeconomic History    Marital status: Single     Spouse name: Not on file    Number of children: Not on file    Years of education: Not on file    Highest education level: Not on file   Occupational History    Not on file   Tobacco Use    Smoking status: Every Day     Packs/day: 0.50     Types: Cigarettes    Smokeless tobacco: Never   Vaping Use    Vaping Use: Never used   Substance and Sexual Activity    Alcohol use: Not Currently    Drug use: Not Currently    Sexual activity: Not on file     Comment: lmp- 2 weeks ago   Other Topics Concern    Not on file   Social History Narrative    Not on file     Social Determinants of Health     Financial Resource Strain: Not on file   Food Insecurity: Not on file   Transportation Needs: Not on file   Physical Activity: Not on file   Stress: Not on file   Social Connections: Not on file   Intimate Partner Violence: Not on file   Housing Stability: Not on file     Family History   Problem Relation Age of Onset    Hypertension Father     Heart Attack Father         x4    Neuropathy Father         severe     Atrial Fibrillation Father     Other Sister         hysterectomy, appendix removal     Heart Disease Mother     Other Sister         appendix removal    Hypertension Mother     Other Mother         carpal tunnel, nerve damage    Asthma Sister        Review of Systems  Constitutional: Negative  GI: Neg GI ROS  Genitourinary: Genitourinary negative    Urinalysis  UA - Dipstick  Results for orders placed or performed in visit on 11/18/22   AMB POC URINALYSIS DIP STICK AUTO W/O MICRO   Result Value Ref Range    Color (UA POC)      Clarity (UA POC)      Glucose, Urine, POC Negative Negative    Bilirubin, Urine, POC Negative Negative    KETONES, Urine, POC Negative Negative    Specific Gravity, Urine, POC 1.020 1.001 - 1.035    Blood (UA POC) Negative Negative    pH, Urine, POC 7.0 4.6 - 8.0    Protein, Urine, POC Negative Negative    Urobilinogen, POC 2 mg/dL     Nitrite, Urine, POC Negative Negative    Leukocyte Esterase, Urine, POC Negative Negative       PHYSICAL EXAM    General appearance - well appearing and in no distress  Mental status - alert, oriented to person, place, and time  Neck - supple, no significant adenopathy  Chest/Lung-  Quiet, even and easy respiratory effort without use of accessory muscles  Skin - normal coloration and turgor, no rashes'      Assessment and Plan    ICD-10-CM    1. Kidney stone  N20.0 AMB POC URINALYSIS DIP STICK AUTO W/O MICRO     AMB POC XRAY ABDOMEN 1 VIEW     Stone [employer]     tamsulosin (FLOMAX) 0.4 MG capsule     potassium citrate (UROCIT-K 5) 5 MEQ (540 MG) extended release tablet        KUB clear today. Will give a few doses of flomax. Stone prevention discussed. She would like to begin urocit. Script sent. Fragments sent for analysis. Will call results. ROV in 3 months. To call sooner if needed. Antonia Amin is supervising physician today and he approves plan of care.

## 2022-11-25 LAB
COLOR STONE: NORMAL
COM MFR STONE: 100 %
LABORATORY COMMENT REPORT: NORMAL
LABORATORY COMMENT REPORT: NORMAL
Lab: NORMAL
Lab: NORMAL
PHOTO: NORMAL
SIZE STONE: NORMAL MM
SPECIMEN SOURCE: NORMAL
STONE COMPOSITION: NORMAL
WT STONE: 20 MG

## 2022-11-29 RX ORDER — AMLODIPINE BESYLATE 10 MG/1
10 TABLET ORAL DAILY
Qty: 30 TABLET | Refills: 4 | Status: SHIPPED | OUTPATIENT
Start: 2022-11-29

## 2023-02-23 ENCOUNTER — TELEMEDICINE (OUTPATIENT)
Dept: INTERNAL MEDICINE CLINIC | Facility: CLINIC | Age: 38
End: 2023-02-23
Payer: MEDICAID

## 2023-02-23 DIAGNOSIS — U07.1 COVID-19: Primary | ICD-10-CM

## 2023-02-23 DIAGNOSIS — I10 PRIMARY HYPERTENSION: ICD-10-CM

## 2023-02-23 DIAGNOSIS — E66.01 SEVERE OBESITY (BMI 35.0-39.9) WITH COMORBIDITY (HCC): ICD-10-CM

## 2023-02-23 PROCEDURE — 99213 OFFICE O/P EST LOW 20 MIN: CPT | Performed by: NURSE PRACTITIONER

## 2023-02-23 RX ORDER — CITALOPRAM 10 MG/1
10 TABLET ORAL DAILY
COMMUNITY
End: 2023-02-23 | Stop reason: SDUPTHER

## 2023-02-23 RX ORDER — CITALOPRAM 10 MG/1
10 TABLET ORAL DAILY
Qty: 30 TABLET | Refills: 11 | Status: SHIPPED | OUTPATIENT
Start: 2023-02-23

## 2023-02-23 SDOH — ECONOMIC STABILITY: HOUSING INSECURITY
IN THE LAST 12 MONTHS, WAS THERE A TIME WHEN YOU DID NOT HAVE A STEADY PLACE TO SLEEP OR SLEPT IN A SHELTER (INCLUDING NOW)?: NO

## 2023-02-23 SDOH — ECONOMIC STABILITY: INCOME INSECURITY: HOW HARD IS IT FOR YOU TO PAY FOR THE VERY BASICS LIKE FOOD, HOUSING, MEDICAL CARE, AND HEATING?: SOMEWHAT HARD

## 2023-02-23 SDOH — ECONOMIC STABILITY: FOOD INSECURITY: WITHIN THE PAST 12 MONTHS, THE FOOD YOU BOUGHT JUST DIDN'T LAST AND YOU DIDN'T HAVE MONEY TO GET MORE.: SOMETIMES TRUE

## 2023-02-23 SDOH — ECONOMIC STABILITY: TRANSPORTATION INSECURITY
IN THE PAST 12 MONTHS, HAS LACK OF TRANSPORTATION KEPT YOU FROM MEETINGS, WORK, OR FROM GETTING THINGS NEEDED FOR DAILY LIVING?: NO

## 2023-02-23 SDOH — ECONOMIC STABILITY: FOOD INSECURITY: WITHIN THE PAST 12 MONTHS, YOU WORRIED THAT YOUR FOOD WOULD RUN OUT BEFORE YOU GOT MONEY TO BUY MORE.: SOMETIMES TRUE

## 2023-02-23 ASSESSMENT — ENCOUNTER SYMPTOMS
VOMITING: 0
COUGH: 1

## 2023-02-23 ASSESSMENT — PATIENT HEALTH QUESTIONNAIRE - PHQ9
SUM OF ALL RESPONSES TO PHQ9 QUESTIONS 1 & 2: 0
1. LITTLE INTEREST OR PLEASURE IN DOING THINGS: 0
SUM OF ALL RESPONSES TO PHQ QUESTIONS 1-9: 0
2. FEELING DOWN, DEPRESSED OR HOPELESS: 0

## 2023-02-23 NOTE — PROGRESS NOTES
Simon Dawson (:  1985) is a Established patient, here for evaluation of the following:    Assessment & Plan   Below is the assessment and plan developed based on review of pertinent history, physical exam, labs, studies, and medications. 1. COVID-19  2. Severe obesity (BMI 35.0-39. 9) with comorbidity ([address]  3. Primary hypertension  No follow-ups on file. Patient with covid x 3 days  High risk with obesity and hypertension and smoking  Start paxlovid  Discussed med side effects and risks  Fu if no improvement or worsening      Subjective   Patient is here for virtual visit for covid. She got sick 3 days ago. Her kids are sick as well. She has had headache and been sick. Her kids tested positive for covid. She did an at-home covid test and was positive. Positive For Covid-19  This is a new problem. The current episode started in the past 7 days. Associated symptoms include congestion, coughing, a fever (101), headaches, myalgias and vertigo. Pertinent negatives include no vomiting. She has tried acetaminophen for the symptoms. Review of Systems   Constitutional:  Positive for fever (101). HENT:  Positive for congestion. Respiratory:  Positive for cough. Gastrointestinal:  Negative for vomiting. Musculoskeletal:  Positive for myalgias. Neurological:  Positive for vertigo and headaches.         Objective   Patient-Reported Vitals  Patient-Reported Temperature: 100.2  Patient-Reported Weight: 263lb  Patient-Reported Height: 5'9\"       Physical Exam  [INSTRUCTIONS:  \"[x]\" Indicates a positive item  \"[]\" Indicates a negative item  -- DELETE ALL ITEMS NOT EXAMINED]    Constitutional: [x] Appears well-developed and well-nourished [x] No apparent distress      [] Abnormal -     Mental status: [x] Alert and awake  [x] Oriented to person/place/time [x] Able to follow commands    [] Abnormal -     Eyes:   EOM    [x]  Normal    [] Abnormal -   Sclera  [x]  Normal    [] Abnormal - Discharge [x]  None visible   [] Abnormal -     HENT: [x] Normocephalic, atraumatic  [] Abnormal -   [x] Mouth/Throat: Mucous membranes are moist    External Ears [x] Normal  [] Abnormal -    Neck: [x] No visualized mass [] Abnormal -     Pulmonary/Chest: [x] Respiratory effort normal   [x] No visualized signs of difficulty breathing or respiratory distress        [] Abnormal -      Musculoskeletal:   [x] Normal gait with no signs of ataxia         [x] Normal range of motion of neck        [] Abnormal -     Neurological:        [x] No Facial Asymmetry (Cranial nerve 7 motor function) (limited exam due to video visit)          [x] No gaze palsy        [] Abnormal -          Skin:        [x] No significant exanthematous lesions or discoloration noted on facial skin         [] Abnormal -            Psychiatric:       [x] Normal Affect [] Abnormal -        [x] No Hallucinations    Other pertinent observable physical exam findings:-             Mark Jerez, was evaluated through a synchronous (real-time) audio-video encounter. The patient (or guardian if applicable) is aware that this is a billable service, which includes applicable co-pays. This Virtual Visit was conducted with patient's (and/or legal guardian's) consent. The visit was conducted pursuant to the emergency declaration under the [facility], [phone] waiver authority and the [employer] and [employer] General Act. Patient identification was verified, and a caregiver was present when appropriate.    The patient was located at Home: [address] [place] [address]  Provider was located at [facility] ([facility]): [address] [place] [address] Saturnino Palencia,  [address] [place] [address], [facility]

## 2023-02-28 ENCOUNTER — TELEPHONE (OUTPATIENT)
Dept: INTERNAL MEDICINE CLINIC | Facility: CLINIC | Age: 38
End: 2023-02-28

## 2023-02-28 NOTE — TELEPHONE ENCOUNTER
Patient states her kidney hurt for the last couple of days, and her urine is cloudy and burns when she pees.

## 2023-03-02 ENCOUNTER — OFFICE VISIT (OUTPATIENT)
Dept: INTERNAL MEDICINE CLINIC | Facility: CLINIC | Age: 38
End: 2023-03-02

## 2023-03-02 VITALS
DIASTOLIC BLOOD PRESSURE: 60 MMHG | OXYGEN SATURATION: 97 % | WEIGHT: 266 LBS | BODY MASS INDEX: 39.4 KG/M2 | HEART RATE: 75 BPM | SYSTOLIC BLOOD PRESSURE: 128 MMHG | HEIGHT: 69 IN

## 2023-03-02 DIAGNOSIS — E78.2 MIXED HYPERLIPIDEMIA: ICD-10-CM

## 2023-03-02 DIAGNOSIS — E66.01 SEVERE OBESITY (BMI 35.0-39.9) WITH COMORBIDITY (HCC): ICD-10-CM

## 2023-03-02 DIAGNOSIS — I10 PRIMARY HYPERTENSION: ICD-10-CM

## 2023-03-02 DIAGNOSIS — R30.0 BURNING WITH URINATION: Primary | ICD-10-CM

## 2023-03-02 LAB
BILIRUBIN, URINE, POC: NEGATIVE
BLOOD URINE, POC: NEGATIVE
ERYTHROCYTE [DISTWIDTH] IN BLOOD BY AUTOMATED COUNT: 12.3 % (ref 11.9–14.6)
GLUCOSE URINE, POC: NEGATIVE
HCT VFR BLD AUTO: 41.5 % (ref 35.8–46.3)
HGB BLD-MCNC: 12.9 G/DL (ref 11.7–15.4)
KETONES, URINE, POC: NEGATIVE
LEUKOCYTE ESTERASE, URINE, POC: NEGATIVE
MCH RBC QN AUTO: 29.2 PG (ref 26.1–32.9)
MCHC RBC AUTO-ENTMCNC: 31.1 G/DL (ref 31.4–35)
MCV RBC AUTO: 93.9 FL (ref 82–102)
NITRITE, URINE, POC: NEGATIVE
NRBC # BLD: 0 K/UL (ref 0–0.2)
PH, URINE, POC: 6.5 (ref 4.6–8)
PLATELET # BLD AUTO: 240 K/UL (ref 150–450)
PMV BLD AUTO: 10.9 FL (ref 9.4–12.3)
PROTEIN,URINE, POC: NEGATIVE
RBC # BLD AUTO: 4.42 M/UL (ref 4.05–5.2)
SPECIFIC GRAVITY, URINE, POC: 1.01 (ref 1–1.03)
URINALYSIS CLARITY, POC: CLEAR
URINALYSIS COLOR, POC: YELLOW
UROBILINOGEN, POC: NORMAL
WBC # BLD AUTO: 7.1 K/UL (ref 4.3–11.1)

## 2023-03-02 ASSESSMENT — ENCOUNTER SYMPTOMS
VOMITING: 0
NAUSEA: 1

## 2023-03-02 ASSESSMENT — PATIENT HEALTH QUESTIONNAIRE - PHQ9
1. LITTLE INTEREST OR PLEASURE IN DOING THINGS: 0
SUM OF ALL RESPONSES TO PHQ QUESTIONS 1-9: 0
2. FEELING DOWN, DEPRESSED OR HOPELESS: 0
SUM OF ALL RESPONSES TO PHQ QUESTIONS 1-9: 0
SUM OF ALL RESPONSES TO PHQ9 QUESTIONS 1 & 2: 0
SUM OF ALL RESPONSES TO PHQ QUESTIONS 1-9: 0
SUM OF ALL RESPONSES TO PHQ QUESTIONS 1-9: 0

## 2023-03-02 NOTE — PROGRESS NOTES
Maral Cruz (:  1985) is a 43 y.o. female,Established patient, here for evaluation of the following chief complaint(s):  Urinary Pain         ASSESSMENT/PLAN:  1. Burning with urination  -     AMB POC URINALYSIS DIP STICK MANUAL W/O MICRO  -     Culture, Urine; Future  -     Culture, Urine; Future  2. Primary hypertension  -     Hemoglobin A1C; Future  -     Lipid Panel; Future  -     Comprehensive Metabolic Panel; Future  -     CBC; Future  -     Vitamin D 25 Hydroxy; Future  3. Mixed hyperlipidemia  -     Hemoglobin A1C; Future  -     Lipid Panel; Future  -     Comprehensive Metabolic Panel; Future  -     CBC; Future  -     Vitamin D 25 Hydroxy; Future  4. Severe obesity (BMI 35.0-39. 9) with comorbidity ([facility] [address] [facility]  -     Hemoglobin A1C; Future  -     Lipid Panel; Future  -     Comprehensive Metabolic Panel; Future  -     CBC; Future  -     Vitamin D 25 Hydroxy; Future      No follow-ups on file. Ua w/o evidence of infection, culture urine  Requests routine labs done today  Will draw lab  Lungs clear, feeling better  Advise to hydrate       Subjective   SUBJECTIVE/OBJECTIVE:  Patient is here for urinary symptoms. She has had covid and is recovering, the symptoms weren't as severe as she had thought it would be. She was wiped out and fatigued. She has had lower back pain and thinks it is kidney pain - was mid-line back pain. She woke up 2 days ago feeling like someone had punched her in the back and her urine looked weird. She has hx of kidney stones. Today it looked better and clear - she has been trying to flush herself out. She also has had some urinary burning but it didn't feel like UTI or kidney stone burning - it felt different. Urinary Pain   This is a new problem. The current episode started in the past 7 days. The problem has been gradually improving. The pain is severe. Associated symptoms include nausea.  Pertinent negatives include no chills, discharge, flank pain, hematuria or vomiting. Associated symptoms comments: Nausea on paxlovid  . Her past medical history is significant for kidney stones. Review of Systems   Constitutional:  Negative for chills. Gastrointestinal:  Positive for nausea. Negative for vomiting. Genitourinary:  Negative for flank pain and hematuria. Objective   Physical Exam  Constitutional:       Appearance: Normal appearance. She is obese. She is not ill-appearing. Eyes:      Extraocular Movements: Extraocular movements intact. Pupils: Pupils are equal, round, and reactive to light. Cardiovascular:      Rate and Rhythm: Normal rate and regular rhythm. Pulmonary:      Effort: Pulmonary effort is normal.      Breath sounds: Normal breath sounds. No wheezing or rhonchi. Musculoskeletal:      Cervical back: Neck supple. Neurological:      General: No focal deficit present. Mental Status: She is alert. Psychiatric:         Mood and Affect: Mood normal.                An electronic signature was used to authenticate this note.     --EPHRAIM Brennan - CNP

## 2023-03-03 LAB
25(OH)D3 SERPL-MCNC: 22.6 NG/ML (ref 30–100)
ALBUMIN SERPL-MCNC: 3.9 G/DL (ref 3.5–5)
ALBUMIN/GLOB SERPL: 1.2 (ref 0.4–1.6)
ALP SERPL-CCNC: 76 U/L (ref 50–136)
ALT SERPL-CCNC: 22 U/L (ref 12–65)
ANION GAP SERPL CALC-SCNC: 3 MMOL/L (ref 2–11)
AST SERPL-CCNC: 12 U/L (ref 15–37)
BILIRUB SERPL-MCNC: 0.2 MG/DL (ref 0.2–1.1)
BUN SERPL-MCNC: 12 MG/DL (ref 6–23)
CALCIUM SERPL-MCNC: 10.1 MG/DL (ref 8.3–10.4)
CHLORIDE SERPL-SCNC: 108 MMOL/L (ref 101–110)
CHOLEST SERPL-MCNC: 215 MG/DL
CO2 SERPL-SCNC: 27 MMOL/L (ref 21–32)
CREAT SERPL-MCNC: 0.6 MG/DL (ref 0.6–1)
EST. AVERAGE GLUCOSE BLD GHB EST-MCNC: 108 MG/DL
GLOBULIN SER CALC-MCNC: 3.2 G/DL (ref 2.8–4.5)
GLUCOSE SERPL-MCNC: 89 MG/DL (ref 65–100)
HBA1C MFR BLD: 5.4 % (ref 4.8–5.6)
HDLC SERPL-MCNC: 44 MG/DL (ref 40–60)
HDLC SERPL: 4.9
LDLC SERPL CALC-MCNC: 126 MG/DL
POTASSIUM SERPL-SCNC: 4.3 MMOL/L (ref 3.5–5.1)
PROT SERPL-MCNC: 7.1 G/DL (ref 6.3–8.2)
SODIUM SERPL-SCNC: 138 MMOL/L (ref 133–143)
TRIGL SERPL-MCNC: 225 MG/DL (ref 35–150)
VLDLC SERPL CALC-MCNC: 45 MG/DL (ref 6–23)

## 2023-03-05 LAB
BACTERIA SPEC CULT: ABNORMAL
BACTERIA SPEC CULT: ABNORMAL
SERVICE CMNT-IMP: ABNORMAL

## 2023-03-07 RX ORDER — NITROFURANTOIN 25; 75 MG/1; MG/1
100 CAPSULE ORAL 2 TIMES DAILY
Qty: 10 CAPSULE | Refills: 0 | Status: SHIPPED | OUTPATIENT
Start: 2023-03-07 | End: 2023-03-12

## 2023-03-15 ENCOUNTER — PATIENT MESSAGE (OUTPATIENT)
Dept: INTERNAL MEDICINE CLINIC | Facility: CLINIC | Age: 38
End: 2023-03-15

## 2023-03-15 RX ORDER — BLOOD SUGAR DIAGNOSTIC
STRIP MISCELLANEOUS
Qty: 2 KIT | Refills: 0 | Status: SHIPPED | OUTPATIENT
Start: 2023-03-15

## 2023-03-15 RX ORDER — CIPROFLOXACIN 500 MG/1
500 TABLET, FILM COATED ORAL 2 TIMES DAILY
Qty: 6 TABLET | Refills: 0 | Status: SHIPPED | OUTPATIENT
Start: 2023-03-15 | End: 2023-03-18

## 2023-03-15 NOTE — TELEPHONE ENCOUNTER
Patient states her car has broken down and is in the shop, and she does not have anyone that can take her anywhere or sit with the kids if she is able to go. She would like to know if you can change the antibiotic and see if it will help? Patient states she understands why you want her to be seen, but she just does not have a way to do that at the moment. Please Advise.

## 2023-03-15 NOTE — TELEPHONE ENCOUNTER
From: Durga Schmitz  To: Nasrin Edward  Sent: 3/15/2023 10:21 AM EDT  Subject: Need different antibiotic    Hi I needed to follow up from my previous visit, the antibiotic I was given for the UTI made me too sick to my stomach so I had to stop taking it a couple days in, and my symptoms have gotten worse again. I am keeping a low grade fever and feeling bad all over along with the classic UTI symptoms. I was hoping to see if a substitution could be called in, also I was wondering if another box of at home covid tests could be called in since insurance will cover I would like to have those on hand. Due to the fever and body aches and a mild sore throat I would like to just double check that there was no rebound after taking the antivirals. If any questions please give me a call [phone]. Thanks.

## 2023-05-01 RX ORDER — AMLODIPINE BESYLATE 10 MG/1
10 TABLET ORAL DAILY
Qty: 30 TABLET | Refills: 4 | Status: SHIPPED | OUTPATIENT
Start: 2023-05-01

## 2023-05-01 RX ORDER — CITALOPRAM 10 MG/1
10 TABLET ORAL DAILY
Qty: 30 TABLET | Refills: 11 | Status: SHIPPED | OUTPATIENT
Start: 2023-05-01

## 2023-09-11 ENCOUNTER — NURSE TRIAGE (OUTPATIENT)
Dept: OTHER | Facility: CLINIC | Age: 38
End: 2023-09-11

## 2023-09-11 NOTE — TELEPHONE ENCOUNTER
Location of patient: [place]    Received call from Lindsay at [employer] with [employer]. Subjective: Caller states \"I am having UTI symptoms\"     Current Symptoms: Frequency, urgency, dysuria and bilateral flank pain. Bilateral eye twitching. While L eye is twitching lift up her L lip. Has a vitamin D deficiency. History of bells palsy on L side of face 10 years ago. Onset: 3 days ago; worsening    Associated Symptoms: NA    Pain Severity: 5-6/10; ; constant, moderate    Temperature: low grade fever up to 100.5    What has been tried: Water flush    LMP: NA Pregnant: NA    Recommended disposition: Go to [facility]    Care advice provided, patient verbalizes understanding; denies any other questions or concerns; instructed to call back for any new or worsening symptoms. Writer provided warm transfer to [facility] for second level triage and not being able to come in today with urgent disposition. Attention Provider: Thank you for allowing me to participate in the care of your patient. The patient was connected to triage in response to information provided to the ECC/PSC. Please do not respond through this encounter as the response is not directed to a shared pool.     Reason for Disposition   Side (flank) or lower back pain present    Protocols used: Urination Pain - Female-ADULT-[place]

## 2023-09-12 ENCOUNTER — OFFICE VISIT (OUTPATIENT)
Dept: INTERNAL MEDICINE CLINIC | Facility: CLINIC | Age: 38
End: 2023-09-12
Payer: MEDICAID

## 2023-09-12 ENCOUNTER — PATIENT MESSAGE (OUTPATIENT)
Dept: INTERNAL MEDICINE CLINIC | Facility: CLINIC | Age: 38
End: 2023-09-12

## 2023-09-12 VITALS
WEIGHT: 274 LBS | OXYGEN SATURATION: 96 % | SYSTOLIC BLOOD PRESSURE: 122 MMHG | HEIGHT: 69 IN | DIASTOLIC BLOOD PRESSURE: 88 MMHG | BODY MASS INDEX: 40.58 KG/M2 | TEMPERATURE: 98.8 F | HEART RATE: 83 BPM

## 2023-09-12 DIAGNOSIS — N30.00 ACUTE CYSTITIS WITHOUT HEMATURIA: ICD-10-CM

## 2023-09-12 DIAGNOSIS — E78.2 MIXED HYPERLIPIDEMIA: ICD-10-CM

## 2023-09-12 DIAGNOSIS — R35.0 URINARY FREQUENCY: ICD-10-CM

## 2023-09-12 DIAGNOSIS — F11.24 OPIOID DEPENDENCE WITH OPIOID-INDUCED MOOD DISORDER (HCC): ICD-10-CM

## 2023-09-12 DIAGNOSIS — I10 PRIMARY HYPERTENSION: Primary | ICD-10-CM

## 2023-09-12 DIAGNOSIS — E66.01 CLASS 3 SEVERE OBESITY DUE TO EXCESS CALORIES WITH SERIOUS COMORBIDITY AND BODY MASS INDEX (BMI) OF 40.0 TO 44.9 IN ADULT (HCC): ICD-10-CM

## 2023-09-12 DIAGNOSIS — R79.89 LOW SERUM VITAMIN D: ICD-10-CM

## 2023-09-12 DIAGNOSIS — I10 PRIMARY HYPERTENSION: ICD-10-CM

## 2023-09-12 DIAGNOSIS — R30.0 BURNING WITH URINATION: Primary | ICD-10-CM

## 2023-09-12 LAB
25(OH)D3 SERPL-MCNC: 16.1 NG/ML (ref 30–100)
ALBUMIN SERPL-MCNC: 4.2 G/DL (ref 3.5–5)
ALBUMIN/GLOB SERPL: 1.4 (ref 0.4–1.6)
ALP SERPL-CCNC: 90 U/L (ref 50–136)
ALT SERPL-CCNC: 25 U/L (ref 12–65)
ANION GAP SERPL CALC-SCNC: 6 MMOL/L (ref 2–11)
AST SERPL-CCNC: 17 U/L (ref 15–37)
BILIRUB SERPL-MCNC: 0.3 MG/DL (ref 0.2–1.1)
BILIRUBIN, URINE, POC: NEGATIVE
BLOOD URINE, POC: NEGATIVE
BUN SERPL-MCNC: 10 MG/DL (ref 6–23)
CALCIUM SERPL-MCNC: 10 MG/DL (ref 8.3–10.4)
CHLORIDE SERPL-SCNC: 110 MMOL/L (ref 101–110)
CHOLEST SERPL-MCNC: 220 MG/DL
CO2 SERPL-SCNC: 26 MMOL/L (ref 21–32)
CREAT SERPL-MCNC: 0.6 MG/DL (ref 0.6–1)
ERYTHROCYTE [DISTWIDTH] IN BLOOD BY AUTOMATED COUNT: 12.3 % (ref 11.9–14.6)
EST. AVERAGE GLUCOSE BLD GHB EST-MCNC: 114 MG/DL
GLOBULIN SER CALC-MCNC: 3 G/DL (ref 2.8–4.5)
GLUCOSE SERPL-MCNC: 110 MG/DL (ref 65–100)
GLUCOSE URINE, POC: NEGATIVE
HBA1C MFR BLD: 5.6 % (ref 4.8–5.6)
HCT VFR BLD AUTO: 41.1 % (ref 35.8–46.3)
HDLC SERPL-MCNC: 50 MG/DL (ref 40–60)
HDLC SERPL: 4.4
HGB BLD-MCNC: 13.2 G/DL (ref 11.7–15.4)
KETONES, URINE, POC: NEGATIVE
LDLC SERPL CALC-MCNC: 131.2 MG/DL
LEUKOCYTE ESTERASE, URINE, POC: NEGATIVE
MCH RBC QN AUTO: 29.7 PG (ref 26.1–32.9)
MCHC RBC AUTO-ENTMCNC: 32.1 G/DL (ref 31.4–35)
MCV RBC AUTO: 92.4 FL (ref 82–102)
NITRITE, URINE, POC: NEGATIVE
NRBC # BLD: 0 K/UL (ref 0–0.2)
PH, URINE, POC: 6 (ref 4.6–8)
PLATELET # BLD AUTO: 233 K/UL (ref 150–450)
PMV BLD AUTO: 11 FL (ref 9.4–12.3)
POTASSIUM SERPL-SCNC: 4.1 MMOL/L (ref 3.5–5.1)
PROT SERPL-MCNC: 7.2 G/DL (ref 6.3–8.2)
PROTEIN,URINE, POC: NEGATIVE
RBC # BLD AUTO: 4.45 M/UL (ref 4.05–5.2)
SODIUM SERPL-SCNC: 142 MMOL/L (ref 133–143)
SPECIFIC GRAVITY, URINE, POC: 1.02 (ref 1–1.03)
TRIGL SERPL-MCNC: 194 MG/DL (ref 35–150)
TSH, 3RD GENERATION: 1.42 UIU/ML (ref 0.36–3.74)
URINALYSIS CLARITY, POC: CLEAR
URINALYSIS COLOR, POC: YELLOW
UROBILINOGEN, POC: NORMAL
VLDLC SERPL CALC-MCNC: 38.8 MG/DL (ref 6–23)
WBC # BLD AUTO: 8.3 K/UL (ref 4.3–11.1)

## 2023-09-12 PROCEDURE — 3074F SYST BP LT 130 MM HG: CPT | Performed by: NURSE PRACTITIONER

## 2023-09-12 PROCEDURE — 99214 OFFICE O/P EST MOD 30 MIN: CPT | Performed by: NURSE PRACTITIONER

## 2023-09-12 PROCEDURE — 3079F DIAST BP 80-89 MM HG: CPT | Performed by: NURSE PRACTITIONER

## 2023-09-12 PROCEDURE — 81002 URINALYSIS NONAUTO W/O SCOPE: CPT | Performed by: NURSE PRACTITIONER

## 2023-09-12 RX ORDER — HYDROCHLOROTHIAZIDE 25 MG/1
25 TABLET ORAL EVERY MORNING
Qty: 90 TABLET | Refills: 1 | Status: SHIPPED | OUTPATIENT
Start: 2023-09-12 | End: 2023-09-12 | Stop reason: SINTOL

## 2023-09-12 RX ORDER — SPIRONOLACTONE 25 MG/1
25 TABLET ORAL DAILY
Qty: 30 TABLET | Refills: 5 | Status: SHIPPED | OUTPATIENT
Start: 2023-09-12

## 2023-09-12 RX ORDER — AMLODIPINE BESYLATE 10 MG/1
10 TABLET ORAL DAILY
Qty: 30 TABLET | Refills: 5 | Status: SHIPPED | OUTPATIENT
Start: 2023-09-12

## 2023-09-12 RX ORDER — NITROFURANTOIN 25; 75 MG/1; MG/1
100 CAPSULE ORAL 2 TIMES DAILY
Qty: 10 CAPSULE | Refills: 0 | Status: SHIPPED | OUTPATIENT
Start: 2023-09-12 | End: 2023-09-17

## 2023-09-12 NOTE — TELEPHONE ENCOUNTER
From: Jose Rafael Pierson  To: Buster Farrell  Sent: 9/12/2023 4:08 PM EDT  Subject: New BP med    Hi I just realized the new BP med is hydrochlorithizide and I was given this once before by previous Dr but he stopped it sin c e my calcium was elevated at that time as he says it can raise it further. Is there an alternative diuretic that would not potentially cause the calcium to elevate again? Just being cautious not to have that same issue. Thanks so much!

## 2023-09-12 NOTE — PROGRESS NOTES
kidney stone pain since her procedure in November. She also has had eyes twitchy lately. They have twitched for weeks, she has been more stressed lately and has felt burned out but her eye twitched for a week, then switched to the other eye, then both eyes. Eyes aren't twitchy today. When her left upper eyelid twitches she gets a tic in the left lower face too - she has hx of bell's palsy    She also has had increased leg swelling lately, thinks it is from amlodipine and doesn't want to change bp meds    Urinary Tract Infection  This is a recurrent problem. The current episode started in the past 7 days. The problem has been gradually worsening since onset. Associated symptoms include pain. Pertinent negatives include no hematuria. The pain is present in the abdomen, suprapubic region and back. Pain scale: mild crampy dull ache. Eye Problem         Review of Systems   Genitourinary:  Negative for hematuria. Objective   Physical Exam  Vitals reviewed. Constitutional:       Appearance: Normal appearance. She is not ill-appearing. HENT:      Head: Normocephalic. Right Ear: External ear normal.      Left Ear: External ear normal.   Eyes:      Extraocular Movements: Extraocular movements intact. Pupils: Pupils are equal, round, and reactive to light. Cardiovascular:      Rate and Rhythm: Normal rate and regular rhythm. Pulmonary:      Effort: Pulmonary effort is normal.      Breath sounds: No wheezing or rhonchi. Musculoskeletal:      Cervical back: Neck supple. Neurological:      General: No focal deficit present. Mental Status: She is alert. An electronic signature was used to authenticate this note.     --EHPRAIM Guzmán - CNP

## 2023-09-13 RX ORDER — MELATONIN
1000 2 TIMES DAILY
Qty: 180 TABLET | Refills: 1 | Status: SHIPPED | OUTPATIENT
Start: 2023-09-13

## 2023-09-15 LAB
BACTERIA SPEC CULT: NORMAL
SERVICE CMNT-IMP: NORMAL

## 2023-09-18 ENCOUNTER — TELEPHONE (OUTPATIENT)
Dept: INTERNAL MEDICINE CLINIC | Facility: CLINIC | Age: 38
End: 2023-09-18

## 2023-09-18 NOTE — TELEPHONE ENCOUNTER
Patient called and states that she was seen in the ER and would like to know if a nurse could give her a call back in regards to how she is progressing. Please Advise.

## 2023-09-18 NOTE — TELEPHONE ENCOUNTER
Patient states she wanted to call and follow up in regards to her going to the ER yesterday due to her face being swollen so much so she couldn't hardly open her mouth. Patient states it was a dental abscess. She started having tooth pain on Thursday, and started antibiotics. She states she woke up yesterday with her face swollen, and she as having pain, and could feel her pulse in her head. Patient states her BP was high, but she thinks it was from all the pain. Patient states they did cut and drain her mouth. Per patient they cut her three different times, and tried to get some infection out all they got out was some blood. Patient states she was started on augmentin. Patient states her swelling has went down, and her face is still hurting some. Patient states on the labs at the ER her calcium was elevated again. She has not started the Vitamin D yet.

## 2023-10-05 ENCOUNTER — TELEPHONE (OUTPATIENT)
Dept: INTERNAL MEDICINE CLINIC | Facility: CLINIC | Age: 38
End: 2023-10-05

## 2024-01-11 ENCOUNTER — TELEPHONE (OUTPATIENT)
Dept: INTERNAL MEDICINE CLINIC | Facility: CLINIC | Age: 39
End: 2024-01-11

## 2024-01-15 ENCOUNTER — TELEPHONE (OUTPATIENT)
Dept: INTERNAL MEDICINE CLINIC | Facility: CLINIC | Age: 39
End: 2024-01-15

## 2024-01-15 NOTE — TELEPHONE ENCOUNTER
Patient called and states that she has some questions that she would like to speak to the nurse about. Patient has a follow up appointment on 1/18/24 but has some questions. Please Advise.

## 2024-01-15 NOTE — TELEPHONE ENCOUNTER
Spoke with patient she complains of panic attacks which she normally gets with high calcium. Patient states her BP is up as well, and she has  not started the other BP medication. She wanted to know if it would be ok to take that. Patient notified ok to start taking for High Blood pressure, and to drink lots of water no caffeine due to making it feeling antsy. Patient states she feels shaky, as well as having the panic attacks. Patient notified to do some deep breathing exercises to see if it will help her. Try to stay busy to help keep her mind occupied so she doesn't dwell on the panic attacks.

## 2024-01-18 ENCOUNTER — OFFICE VISIT (OUTPATIENT)
Dept: INTERNAL MEDICINE CLINIC | Facility: CLINIC | Age: 39
End: 2024-01-18
Payer: MEDICAID

## 2024-01-18 VITALS
DIASTOLIC BLOOD PRESSURE: 70 MMHG | SYSTOLIC BLOOD PRESSURE: 130 MMHG | BODY MASS INDEX: 42.51 KG/M2 | OXYGEN SATURATION: 97 % | HEART RATE: 69 BPM | WEIGHT: 287 LBS | HEIGHT: 69 IN | TEMPERATURE: 97.6 F

## 2024-01-18 DIAGNOSIS — E83.52 HYPERCALCEMIA: ICD-10-CM

## 2024-01-18 DIAGNOSIS — R10.10 UPPER ABDOMINAL PAIN: Primary | ICD-10-CM

## 2024-01-18 DIAGNOSIS — I10 PRIMARY HYPERTENSION: ICD-10-CM

## 2024-01-18 DIAGNOSIS — F41.0 PANIC ANXIETY SYNDROME: ICD-10-CM

## 2024-01-18 DIAGNOSIS — F11.24 OPIOID DEPENDENCE WITH OPIOID-INDUCED MOOD DISORDER (HCC): ICD-10-CM

## 2024-01-18 DIAGNOSIS — R79.89 LOW SERUM VITAMIN D: ICD-10-CM

## 2024-01-18 PROCEDURE — 3078F DIAST BP <80 MM HG: CPT | Performed by: NURSE PRACTITIONER

## 2024-01-18 PROCEDURE — 3075F SYST BP GE 130 - 139MM HG: CPT | Performed by: NURSE PRACTITIONER

## 2024-01-18 PROCEDURE — 99214 OFFICE O/P EST MOD 30 MIN: CPT | Performed by: NURSE PRACTITIONER

## 2024-01-18 RX ORDER — CITALOPRAM 20 MG/1
20 TABLET ORAL DAILY
Qty: 30 TABLET | Refills: 3 | Status: SHIPPED | OUTPATIENT
Start: 2024-01-18

## 2024-01-18 RX ORDER — HYDROXYZINE HYDROCHLORIDE 25 MG/1
25 TABLET, FILM COATED ORAL EVERY 8 HOURS PRN
Qty: 30 TABLET | Refills: 0 | Status: SHIPPED | OUTPATIENT
Start: 2024-01-18 | End: 2024-02-17

## 2024-01-18 ASSESSMENT — PATIENT HEALTH QUESTIONNAIRE - PHQ9
SUM OF ALL RESPONSES TO PHQ QUESTIONS 1-9: 1
SUM OF ALL RESPONSES TO PHQ QUESTIONS 1-9: 1
1. LITTLE INTEREST OR PLEASURE IN DOING THINGS: 1
2. FEELING DOWN, DEPRESSED OR HOPELESS: 0
SUM OF ALL RESPONSES TO PHQ QUESTIONS 1-9: 1
SUM OF ALL RESPONSES TO PHQ QUESTIONS 1-9: 1
SUM OF ALL RESPONSES TO PHQ9 QUESTIONS 1 & 2: 1

## 2024-01-18 NOTE — PROGRESS NOTES
distension.      Palpations: Abdomen is soft.      Tenderness: There is no abdominal tenderness.   Musculoskeletal:      Cervical back: Neck supple.   Neurological:      General: No focal deficit present.      Mental Status: She is alert.   Psychiatric:         Mood and Affect: Mood normal.                  An electronic signature was used to authenticate this note.    --Shanon Mckee, APRIVAN - CNP

## 2024-01-19 LAB
25(OH)D3 SERPL-MCNC: 15.3 NG/ML (ref 30–100)
ALBUMIN SERPL-MCNC: 4.1 G/DL (ref 3.5–5)
ALBUMIN/GLOB SERPL: 1.2 (ref 0.4–1.6)
ALP SERPL-CCNC: 97 U/L (ref 50–136)
ALT SERPL-CCNC: 28 U/L (ref 12–65)
ANION GAP SERPL CALC-SCNC: 5 MMOL/L (ref 2–11)
AST SERPL-CCNC: 9 U/L (ref 15–37)
BILIRUB SERPL-MCNC: 0.2 MG/DL (ref 0.2–1.1)
BUN SERPL-MCNC: 8 MG/DL (ref 6–23)
CALCIUM SERPL-MCNC: 10.4 MG/DL (ref 8.3–10.4)
CHLORIDE SERPL-SCNC: 106 MMOL/L (ref 103–113)
CO2 SERPL-SCNC: 26 MMOL/L (ref 21–32)
CREAT SERPL-MCNC: 0.7 MG/DL (ref 0.6–1)
GLOBULIN SER CALC-MCNC: 3.4 G/DL (ref 2.8–4.5)
GLUCOSE SERPL-MCNC: 118 MG/DL (ref 65–100)
LIPASE SERPL-CCNC: 93 U/L (ref 73–393)
POTASSIUM SERPL-SCNC: 4.4 MMOL/L (ref 3.5–5.1)
PROT SERPL-MCNC: 7.5 G/DL (ref 6.3–8.2)
SODIUM SERPL-SCNC: 137 MMOL/L (ref 136–146)

## 2024-03-07 RX ORDER — AMLODIPINE BESYLATE 10 MG/1
10 TABLET ORAL DAILY
Qty: 30 TABLET | Refills: 5 | Status: SHIPPED | OUTPATIENT
Start: 2024-03-07

## 2024-03-26 ENCOUNTER — OFFICE VISIT (OUTPATIENT)
Dept: INTERNAL MEDICINE CLINIC | Facility: CLINIC | Age: 39
End: 2024-03-26
Payer: MEDICAID

## 2024-03-26 VITALS
TEMPERATURE: 98.8 F | DIASTOLIC BLOOD PRESSURE: 90 MMHG | OXYGEN SATURATION: 98 % | BODY MASS INDEX: 43.4 KG/M2 | WEIGHT: 293 LBS | HEIGHT: 69 IN | HEART RATE: 82 BPM | SYSTOLIC BLOOD PRESSURE: 130 MMHG

## 2024-03-26 DIAGNOSIS — F11.24 OPIOID DEPENDENCE WITH OPIOID-INDUCED MOOD DISORDER (HCC): ICD-10-CM

## 2024-03-26 DIAGNOSIS — F41.0 PANIC ATTACKS: Primary | ICD-10-CM

## 2024-03-26 DIAGNOSIS — E83.52 HYPERCALCEMIA: ICD-10-CM

## 2024-03-26 DIAGNOSIS — I10 PRIMARY HYPERTENSION: ICD-10-CM

## 2024-03-26 DIAGNOSIS — R07.89 ATYPICAL CHEST PAIN: ICD-10-CM

## 2024-03-26 DIAGNOSIS — F41.9 ANXIETY: ICD-10-CM

## 2024-03-26 PROCEDURE — 3075F SYST BP GE 130 - 139MM HG: CPT | Performed by: NURSE PRACTITIONER

## 2024-03-26 PROCEDURE — 3080F DIAST BP >= 90 MM HG: CPT | Performed by: NURSE PRACTITIONER

## 2024-03-26 PROCEDURE — 99214 OFFICE O/P EST MOD 30 MIN: CPT | Performed by: NURSE PRACTITIONER

## 2024-03-26 RX ORDER — LORAZEPAM 1 MG/1
1 TABLET ORAL 3 TIMES DAILY PRN
COMMUNITY
Start: 2024-03-24

## 2024-03-26 RX ORDER — NEBIVOLOL 5 MG/1
5 TABLET ORAL DAILY
Qty: 30 TABLET | Refills: 1 | Status: SHIPPED | OUTPATIENT
Start: 2024-03-26 | End: 2024-03-28

## 2024-03-26 RX ORDER — BUSPIRONE HYDROCHLORIDE 5 MG/1
5 TABLET ORAL 3 TIMES DAILY
Qty: 90 TABLET | Refills: 1 | Status: SHIPPED | OUTPATIENT
Start: 2024-03-26 | End: 2025-03-26

## 2024-03-26 SDOH — ECONOMIC STABILITY: FOOD INSECURITY: WITHIN THE PAST 12 MONTHS, THE FOOD YOU BOUGHT JUST DIDN'T LAST AND YOU DIDN'T HAVE MONEY TO GET MORE.: NEVER TRUE

## 2024-03-26 SDOH — ECONOMIC STABILITY: INCOME INSECURITY: HOW HARD IS IT FOR YOU TO PAY FOR THE VERY BASICS LIKE FOOD, HOUSING, MEDICAL CARE, AND HEATING?: SOMEWHAT HARD

## 2024-03-26 SDOH — ECONOMIC STABILITY: FOOD INSECURITY: WITHIN THE PAST 12 MONTHS, YOU WORRIED THAT YOUR FOOD WOULD RUN OUT BEFORE YOU GOT MONEY TO BUY MORE.: NEVER TRUE

## 2024-03-26 NOTE — PROGRESS NOTES
Joy Whitfield (:  1985) is a 39 y.o. female,Established patient, here for evaluation of the following chief complaint(s):  ER Follow up  (Panic attacks /)         ASSESSMENT/PLAN:  1. Panic attacks  -     [facility] - Tyrese Sanchez MD, Psychiatry, [place]  2. Opioid dependence with opioid-induced mood disorder (HCC)  -     [facility] - Tyrese Sanchez MD, Psychiatry, [place]  3. Anxiety  -     [facility] - Tyrese Sanchez MD, Psychiatry, [place]  4. Atypical chest pain  -     [facility] - [facility] [place]  5. Primary hypertension  -     [facility] - [facility]  6. Hypercalcemia  -     Basic Metabolic Panel; Future  -     Magnesium; Future  -     PTH, intact and calcium; Future      No follow-ups on file.       Reviewed ER records and labs  Mild hypercalcemia  Check PTH next lab  Add bystolic for BP  Has f/u with endocrinology  Chest pain, refer cardiology, suspect anxiety but has risk factors  Panic episodes with anxiety, on celexa, add buspar  Refer to psych  Discussed risk of lorazepam with her hx of addiction, on suboxone    Subjective   SUBJECTIVE/OBJECTIVE:  Patient is here for ER follow up. She had episode of panic at last visit and her last suboxone visit. Those were not full blown panic episodes. Then Saturday she had unprovoked panic. She had been feeling her pressure running high. She sat and then had panic attack. She breathed through it and layed on her left side and drank water and listened to meditation. Then she calmed down. She went to urinate and thought it was over. She checked her BP then and it was very high and then the panic restarted. It picked up and took over another wave of anxiety.   She called 911 because she was having left chest pain. It isn't stabbing pain or severe but it tis there and new. She felt she was going to vomit as well.   Her BP was 185/110 with EMS      Since ER she has tried to take it easy and hasn't smoked. She is trying to take it more easy a

## 2024-03-28 RX ORDER — METOPROLOL SUCCINATE 25 MG/1
12.5 TABLET, EXTENDED RELEASE ORAL DAILY
Qty: 30 TABLET | Refills: 3 | Status: SHIPPED | OUTPATIENT
Start: 2024-03-28

## 2024-04-01 ENCOUNTER — TELEPHONE (OUTPATIENT)
Dept: INTERNAL MEDICINE CLINIC | Facility: CLINIC | Age: 39
End: 2024-04-01

## 2024-04-01 NOTE — TELEPHONE ENCOUNTER
Patient called stating that she has not started the new medications yet. Patient states her BP today was 125/85 HR: 69, and then 116/81 HR 69. She wanted to know if these were normal heart rates. Patient notified yes normal heart rate ranges from . Patient felt better after talking with me about this, and will call back with any questions she has.

## 2024-04-11 ENCOUNTER — NURSE ONLY (OUTPATIENT)
Dept: INTERNAL MEDICINE CLINIC | Facility: CLINIC | Age: 39
End: 2024-04-11

## 2024-04-11 DIAGNOSIS — E83.52 HYPERCALCEMIA: ICD-10-CM

## 2024-04-11 LAB
25(OH)D3 SERPL-MCNC: 17.9 NG/ML (ref 30–100)
ANION GAP SERPL CALC-SCNC: 4 MMOL/L (ref 2–11)
BUN SERPL-MCNC: 10 MG/DL (ref 6–23)
CALCIUM SERPL-MCNC: 10.4 MG/DL (ref 8.3–10.4)
CHLORIDE SERPL-SCNC: 107 MMOL/L (ref 103–113)
CO2 SERPL-SCNC: 28 MMOL/L (ref 21–32)
CREAT SERPL-MCNC: 0.6 MG/DL (ref 0.6–1)
ERYTHROCYTE [DISTWIDTH] IN BLOOD BY AUTOMATED COUNT: 12.2 % (ref 11.9–14.6)
GLUCOSE SERPL-MCNC: 101 MG/DL (ref 65–100)
HCT VFR BLD AUTO: 39 % (ref 35.8–46.3)
HGB BLD-MCNC: 12.4 G/DL (ref 11.7–15.4)
MAGNESIUM SERPL-MCNC: 2.1 MG/DL (ref 1.8–2.4)
MCH RBC QN AUTO: 29.2 PG (ref 26.1–32.9)
MCHC RBC AUTO-ENTMCNC: 31.8 G/DL (ref 31.4–35)
MCV RBC AUTO: 92 FL (ref 82–102)
NRBC # BLD: 0 K/UL (ref 0–0.2)
PLATELET # BLD AUTO: 224 K/UL (ref 150–450)
PMV BLD AUTO: 11 FL (ref 9.4–12.3)
POTASSIUM SERPL-SCNC: 4.3 MMOL/L (ref 3.5–5.1)
RBC # BLD AUTO: 4.24 M/UL (ref 4.05–5.2)
SODIUM SERPL-SCNC: 139 MMOL/L (ref 136–146)
WBC # BLD AUTO: 7.5 K/UL (ref 4.3–11.1)

## 2024-04-14 LAB
CALCIUM SERPL-MCNC: 10.5 MG/DL (ref 8.7–10.2)
PTH-INTACT SERPL-MCNC: ABNORMAL PG/ML (ref 15–65)

## 2024-04-29 ENCOUNTER — OFFICE VISIT (OUTPATIENT)
Dept: INTERNAL MEDICINE CLINIC | Facility: CLINIC | Age: 39
End: 2024-04-29
Payer: MEDICAID

## 2024-04-29 VITALS
DIASTOLIC BLOOD PRESSURE: 80 MMHG | OXYGEN SATURATION: 98 % | HEART RATE: 77 BPM | SYSTOLIC BLOOD PRESSURE: 112 MMHG | BODY MASS INDEX: 43.4 KG/M2 | HEIGHT: 69 IN | TEMPERATURE: 98.2 F | WEIGHT: 293 LBS

## 2024-04-29 DIAGNOSIS — Z12.4 CERVICAL CANCER SCREENING: ICD-10-CM

## 2024-04-29 DIAGNOSIS — K13.70 ORAL LESION: Primary | ICD-10-CM

## 2024-04-29 DIAGNOSIS — Z12.31 ENCOUNTER FOR SCREENING MAMMOGRAM FOR MALIGNANT NEOPLASM OF BREAST: ICD-10-CM

## 2024-04-29 DIAGNOSIS — F41.0 PANIC ATTACKS: ICD-10-CM

## 2024-04-29 DIAGNOSIS — F11.24 OPIOID DEPENDENCE WITH OPIOID-INDUCED MOOD DISORDER (HCC): ICD-10-CM

## 2024-04-29 DIAGNOSIS — F41.9 ANXIETY: ICD-10-CM

## 2024-04-29 PROCEDURE — 3074F SYST BP LT 130 MM HG: CPT | Performed by: NURSE PRACTITIONER

## 2024-04-29 PROCEDURE — 3079F DIAST BP 80-89 MM HG: CPT | Performed by: NURSE PRACTITIONER

## 2024-04-29 PROCEDURE — 99214 OFFICE O/P EST MOD 30 MIN: CPT | Performed by: NURSE PRACTITIONER

## 2024-04-29 NOTE — PROGRESS NOTES
Joy Whitfield (:  1985) is a 39 y.o. female,Established patient, here for evaluation of the following chief complaint(s):  Panic Attack (Follow up /)      Assessment & Plan   1. Oral lesion  2. Encounter for screening mammogram for malignant neoplasm of breast  -     [facility] DIGITAL SCREEN W OR WO CAD BILATERAL; Future  3. Cervical cancer screening  -     [facility], [place]  4. Panic attacks  5. Opioid dependence with opioid-induced mood disorder (HCC)  6. Anxiety      No follow-ups on file.     Advise to make dentist appt asap - concerning areas in her mouth as she is a smoker  Bp controlled, but c/o swelling, advise to try spironolactone and f.u for bp recheck  Advise to reschedule with endocrinology   Advise to schedule with psych  F/u 1 month or as needed      Subjective   Patient is here for follow up.   She went to ER for area in her mouth - finished augmentin.   She still has a spot on her mouth and hasn't made dentist appt yet.     She missed new endocrine appt and missed new dental appt.     She is still concerned with face, neck swelling and now left arm swelling and feels her legs are swollen.   Wt Readings from Last 3 Encounters:  24 : 135.6 kg (299 lb)  24 : (!) 137.9 kg (304 lb)  24 : 130.2 kg (287 lb)    She hasn't scheduled with psych yet - she canceled the referral because she wanted a closer appt but hasn't scheduled one or called back yet.     She hasn't started the buspar yet or metoprolol         Review of Systems       Objective   Physical Exam  Vitals reviewed.   Constitutional:       Appearance: Normal appearance. She is obese.   HENT:      Head: Normocephalic.      Right Ear: External ear normal.      Left Ear: External ear normal.     Left cheek pink and white plaque, nodule left lower jaw/gum, small, firm  Upper dentures, poor dentition lower    Eyes:      Extraocular Movements: Extraocular movements intact.      Pupils: Pupils are equal,

## 2024-06-11 RX ORDER — CITALOPRAM 20 MG/1
20 TABLET ORAL DAILY
Qty: 30 TABLET | Refills: 0 | Status: SHIPPED | OUTPATIENT
Start: 2024-06-11

## 2024-07-16 RX ORDER — CITALOPRAM 20 MG/1
20 TABLET ORAL DAILY
Qty: 30 TABLET | Refills: 3 | Status: SHIPPED | OUTPATIENT
Start: 2024-07-16

## 2024-09-10 RX ORDER — AMLODIPINE BESYLATE 10 MG/1
10 TABLET ORAL DAILY
Qty: 30 TABLET | Refills: 5 | Status: SHIPPED | OUTPATIENT
Start: 2024-09-10

## 2024-10-29 ENCOUNTER — TELEPHONE (OUTPATIENT)
Dept: INTERNAL MEDICINE CLINIC | Facility: CLINIC | Age: 39
End: 2024-10-29

## 2025-01-06 ENCOUNTER — TELEPHONE (OUTPATIENT)
Dept: INTERNAL MEDICINE CLINIC | Facility: CLINIC | Age: 40
End: 2025-01-06

## 2025-02-17 RX ORDER — CITALOPRAM HYDROBROMIDE 20 MG/1
20 TABLET ORAL DAILY
Qty: 30 TABLET | Refills: 0 | Status: SHIPPED | OUTPATIENT
Start: 2025-02-17

## 2025-03-17 RX ORDER — AMLODIPINE BESYLATE 10 MG/1
10 TABLET ORAL DAILY
Qty: 30 TABLET | Refills: 0 | Status: SHIPPED | OUTPATIENT
Start: 2025-03-17

## 2025-03-17 RX ORDER — CITALOPRAM HYDROBROMIDE 20 MG/1
20 TABLET ORAL DAILY
Qty: 30 TABLET | Refills: 0 | Status: SHIPPED | OUTPATIENT
Start: 2025-03-17

## 2025-04-08 ENCOUNTER — TELEPHONE (OUTPATIENT)
Dept: INTERNAL MEDICINE CLINIC | Facility: CLINIC | Age: 40
End: 2025-04-08

## 2025-04-15 ENCOUNTER — OFFICE VISIT (OUTPATIENT)
Dept: INTERNAL MEDICINE CLINIC | Facility: CLINIC | Age: 40
End: 2025-04-15
Payer: MEDICAID

## 2025-04-15 VITALS
WEIGHT: 293 LBS | OXYGEN SATURATION: 97 % | TEMPERATURE: 98.7 F | DIASTOLIC BLOOD PRESSURE: 82 MMHG | SYSTOLIC BLOOD PRESSURE: 122 MMHG | BODY MASS INDEX: 43.4 KG/M2 | HEIGHT: 69 IN | HEART RATE: 83 BPM

## 2025-04-15 DIAGNOSIS — R79.89 LOW SERUM VITAMIN D: ICD-10-CM

## 2025-04-15 DIAGNOSIS — E78.2 MIXED HYPERLIPIDEMIA: ICD-10-CM

## 2025-04-15 DIAGNOSIS — I10 PRIMARY HYPERTENSION: ICD-10-CM

## 2025-04-15 DIAGNOSIS — F41.9 ANXIETY: ICD-10-CM

## 2025-04-15 DIAGNOSIS — R07.89 ATYPICAL CHEST PAIN: ICD-10-CM

## 2025-04-15 DIAGNOSIS — Z12.4 CERVICAL CANCER SCREENING: ICD-10-CM

## 2025-04-15 DIAGNOSIS — R06.09 DYSPNEA ON EXERTION: ICD-10-CM

## 2025-04-15 DIAGNOSIS — F11.24 OPIOID DEPENDENCE WITH OPIOID-INDUCED MOOD DISORDER (HCC): ICD-10-CM

## 2025-04-15 DIAGNOSIS — E83.52 HYPERCALCEMIA: ICD-10-CM

## 2025-04-15 DIAGNOSIS — Z12.31 SCREENING MAMMOGRAM FOR BREAST CANCER: Primary | ICD-10-CM

## 2025-04-15 LAB
25(OH)D3 SERPL-MCNC: 17.2 NG/ML (ref 30–100)
ALBUMIN SERPL-MCNC: 4.1 G/DL (ref 3.5–5)
ALBUMIN/GLOB SERPL: 1.3 (ref 1–1.9)
ALP SERPL-CCNC: 95 U/L (ref 35–104)
ALT SERPL-CCNC: 43 U/L (ref 8–45)
ANION GAP SERPL CALC-SCNC: 8 MMOL/L (ref 7–16)
AST SERPL-CCNC: 28 U/L (ref 15–37)
BILIRUB SERPL-MCNC: 0.2 MG/DL (ref 0–1.2)
BUN SERPL-MCNC: 14 MG/DL (ref 6–23)
CALCIUM SERPL-MCNC: 10.7 MG/DL (ref 8.8–10.2)
CHLORIDE SERPL-SCNC: 102 MMOL/L (ref 98–107)
CHOLEST SERPL-MCNC: 217 MG/DL (ref 0–200)
CO2 SERPL-SCNC: 28 MMOL/L (ref 20–29)
CREAT SERPL-MCNC: 0.58 MG/DL (ref 0.6–1.1)
CREAT UR-MCNC: 33.7 MG/DL (ref 28–217)
ERYTHROCYTE [DISTWIDTH] IN BLOOD BY AUTOMATED COUNT: 12 % (ref 11.9–14.6)
EST. AVERAGE GLUCOSE BLD GHB EST-MCNC: 119 MG/DL
GLOBULIN SER CALC-MCNC: 3.1 G/DL (ref 2.3–3.5)
GLUCOSE SERPL-MCNC: 98 MG/DL (ref 70–99)
HBA1C MFR BLD: 5.8 % (ref 0–5.6)
HCT VFR BLD AUTO: 42 % (ref 35.8–46.3)
HDLC SERPL-MCNC: 46 MG/DL (ref 40–60)
HDLC SERPL: 4.7 (ref 0–5)
HGB BLD-MCNC: 13.1 G/DL (ref 11.7–15.4)
LDLC SERPL CALC-MCNC: 135 MG/DL (ref 0–100)
MCH RBC QN AUTO: 28.7 PG (ref 26.1–32.9)
MCHC RBC AUTO-ENTMCNC: 31.2 G/DL (ref 31.4–35)
MCV RBC AUTO: 91.9 FL (ref 82–102)
MICROALBUMIN UR-MCNC: <1.2 MG/DL (ref 0–20)
MICROALBUMIN/CREAT UR-RTO: NORMAL MG/G (ref 0–30)
NRBC # BLD: 0 K/UL (ref 0–0.2)
PLATELET # BLD AUTO: 219 K/UL (ref 150–450)
PMV BLD AUTO: 10.8 FL (ref 9.4–12.3)
POTASSIUM SERPL-SCNC: 4.3 MMOL/L (ref 3.5–5.1)
PROT SERPL-MCNC: 7.2 G/DL (ref 6.3–8.2)
RBC # BLD AUTO: 4.57 M/UL (ref 4.05–5.2)
SODIUM SERPL-SCNC: 138 MMOL/L (ref 136–145)
TRIGL SERPL-MCNC: 183 MG/DL (ref 0–150)
TSH, 3RD GENERATION: 1.75 UIU/ML (ref 0.27–4.2)
VLDLC SERPL CALC-MCNC: 37 MG/DL (ref 6–23)
WBC # BLD AUTO: 6.3 K/UL (ref 4.3–11.1)

## 2025-04-15 PROCEDURE — 99214 OFFICE O/P EST MOD 30 MIN: CPT | Performed by: NURSE PRACTITIONER

## 2025-04-15 PROCEDURE — 93000 ELECTROCARDIOGRAM COMPLETE: CPT | Performed by: NURSE PRACTITIONER

## 2025-04-15 PROCEDURE — 3074F SYST BP LT 130 MM HG: CPT | Performed by: NURSE PRACTITIONER

## 2025-04-15 PROCEDURE — 3079F DIAST BP 80-89 MM HG: CPT | Performed by: NURSE PRACTITIONER

## 2025-04-15 RX ORDER — CITALOPRAM HYDROBROMIDE 20 MG/1
20 TABLET ORAL DAILY
Qty: 30 TABLET | Refills: 5 | Status: SHIPPED | OUTPATIENT
Start: 2025-04-15

## 2025-04-15 RX ORDER — OLMESARTAN MEDOXOMIL 20 MG/1
20 TABLET ORAL DAILY
Qty: 30 TABLET | Refills: 3 | Status: SHIPPED | OUTPATIENT
Start: 2025-04-15

## 2025-04-15 RX ORDER — AMLODIPINE BESYLATE 10 MG/1
10 TABLET ORAL DAILY
Qty: 30 TABLET | Refills: 0 | Status: CANCELLED | OUTPATIENT
Start: 2025-04-15

## 2025-04-15 SDOH — ECONOMIC STABILITY: FOOD INSECURITY: WITHIN THE PAST 12 MONTHS, THE FOOD YOU BOUGHT JUST DIDN'T LAST AND YOU DIDN'T HAVE MONEY TO GET MORE.: NEVER TRUE

## 2025-04-15 SDOH — ECONOMIC STABILITY: FOOD INSECURITY: WITHIN THE PAST 12 MONTHS, YOU WORRIED THAT YOUR FOOD WOULD RUN OUT BEFORE YOU GOT MONEY TO BUY MORE.: NEVER TRUE

## 2025-04-15 ASSESSMENT — PATIENT HEALTH QUESTIONNAIRE - PHQ9
SUM OF ALL RESPONSES TO PHQ QUESTIONS 1-9: 0
2. FEELING DOWN, DEPRESSED OR HOPELESS: NOT AT ALL
SUM OF ALL RESPONSES TO PHQ QUESTIONS 1-9: 0
1. LITTLE INTEREST OR PLEASURE IN DOING THINGS: NOT AT ALL
SUM OF ALL RESPONSES TO PHQ QUESTIONS 1-9: 0
SUM OF ALL RESPONSES TO PHQ QUESTIONS 1-9: 0

## 2025-04-15 NOTE — ASSESSMENT & PLAN NOTE
Check lipids    Orders:    citalopram (CELEXA) 20 MG tablet; Take 1 tablet by mouth daily    Albumin/Creatinine Ratio, Urine; Future    Hemoglobin A1C; Future    Lipid Panel; Future    TSH    Comprehensive Metabolic Panel; Future    CBC; Future    Vitamin D 25 Hydroxy; Future    PTH, intact and calcium; Future    olmesartan (BENICAR) 20 MG tablet; Take 1 tablet by mouth daily    [facility]

## 2025-04-15 NOTE — ASSESSMENT & PLAN NOTE
Bp controlled but leg swelling, stop amlodipine and try olmesartan    Orders:    citalopram (CELEXA) 20 MG tablet; Take 1 tablet by mouth daily    Albumin/Creatinine Ratio, Urine; Future    Hemoglobin A1C; Future    Lipid Panel; Future    TSH    Comprehensive Metabolic Panel; Future    CBC; Future    Vitamin D 25 Hydroxy; Future    PTH, intact and calcium; Future    olmesartan (BENICAR) 20 MG tablet; Take 1 tablet by mouth daily    [facility]

## 2025-04-15 NOTE — PROGRESS NOTES
Joy Whitfield (:  1985) is a 40 y.o. female,Established patient, here for evaluation of the following chief complaint(s):  Follow-up         Assessment & Plan  Screening mammogram for breast cancer       Orders:    [facility] DIGITAL SCREEN W OR WO CAD BILATERAL; Future    Mixed hyperlipidemia   Check lipids    Orders:    citalopram (CELEXA) 20 MG tablet; Take 1 tablet by mouth daily    Albumin/Creatinine Ratio, Urine; Future    Hemoglobin A1C; Future    Lipid Panel; Future    TSH    Comprehensive Metabolic Panel; Future    CBC; Future    Vitamin D 25 Hydroxy; Future    PTH, intact and calcium; Future    olmesartan (BENICAR) 20 MG tablet; Take 1 tablet by mouth daily    [facility]    Low serum vitamin D    Check level    Orders:    citalopram (CELEXA) 20 MG tablet; Take 1 tablet by mouth daily    Albumin/Creatinine Ratio, Urine; Future    Hemoglobin A1C; Future    Lipid Panel; Future    TSH    Comprehensive Metabolic Panel; Future    CBC; Future    Vitamin D 25 Hydroxy; Future    PTH, intact and calcium; Future    olmesartan (BENICAR) 20 MG tablet; Take 1 tablet by mouth daily    [facility]    Primary hypertension   Bp controlled but leg swelling, stop amlodipine and try olmesartan    Orders:    citalopram (CELEXA) 20 MG tablet; Take 1 tablet by mouth daily    Albumin/Creatinine Ratio, Urine; Future    Hemoglobin A1C; Future    Lipid Panel; Future    TSH    Comprehensive Metabolic Panel; Future    CBC; Future    Vitamin D 25 Hydroxy; Future    PTH, intact and calcium; Future    olmesartan (BENICAR) 20 MG tablet; Take 1 tablet by mouth daily    [facility]    Hypercalcemia   Recheck calcium    Orders:    citalopram (CELEXA) 20 MG tablet; Take 1 tablet by mouth daily    Albumin/Creatinine Ratio, Urine; Future    Hemoglobin A1C; Future    Lipid Panel; Future    TSH    Comprehensive Metabolic

## 2025-04-15 NOTE — ASSESSMENT & PLAN NOTE
Continue celexa    Orders:    citalopram (CELEXA) 20 MG tablet; Take 1 tablet by mouth daily    Albumin/Creatinine Ratio, Urine; Future    Hemoglobin A1C; Future    Lipid Panel; Future    TSH    Comprehensive Metabolic Panel; Future    CBC; Future    Vitamin D 25 Hydroxy; Future    PTH, intact and calcium; Future    olmesartan (BENICAR) 20 MG tablet; Take 1 tablet by mouth daily    [facility]

## 2025-04-15 NOTE — ASSESSMENT & PLAN NOTE
Check level    Orders:    citalopram (CELEXA) 20 MG tablet; Take 1 tablet by mouth daily    Albumin/Creatinine Ratio, Urine; Future    Hemoglobin A1C; Future    Lipid Panel; Future    TSH    Comprehensive Metabolic Panel; Future    CBC; Future    Vitamin D 25 Hydroxy; Future    PTH, intact and calcium; Future    olmesartan (BENICAR) 20 MG tablet; Take 1 tablet by mouth daily    [facility]

## 2025-04-15 NOTE — ASSESSMENT & PLAN NOTE
Recheck calcium    Orders:    citalopram (CELEXA) 20 MG tablet; Take 1 tablet by mouth daily    Albumin/Creatinine Ratio, Urine; Future    Hemoglobin A1C; Future    Lipid Panel; Future    TSH    Comprehensive Metabolic Panel; Future    CBC; Future    Vitamin D 25 Hydroxy; Future    PTH, intact and calcium; Future    olmesartan (BENICAR) 20 MG tablet; Take 1 tablet by mouth daily    [facility]

## 2025-04-16 ENCOUNTER — RESULTS FOLLOW-UP (OUTPATIENT)
Dept: INTERNAL MEDICINE CLINIC | Facility: CLINIC | Age: 40
End: 2025-04-16

## 2025-04-17 LAB
CALCIUM SERPL-MCNC: 10.4 MG/DL (ref 8.7–10.2)
PTH-INTACT SERPL-MCNC: ABNORMAL PG/ML (ref 15–65)

## 2025-04-21 ENCOUNTER — OFFICE VISIT (OUTPATIENT)
Dept: INTERNAL MEDICINE CLINIC | Facility: CLINIC | Age: 40
End: 2025-04-21
Payer: MEDICAID

## 2025-04-21 VITALS
BODY MASS INDEX: 43.4 KG/M2 | RESPIRATION RATE: 16 BRPM | HEIGHT: 69 IN | WEIGHT: 293 LBS | HEART RATE: 85 BPM | DIASTOLIC BLOOD PRESSURE: 72 MMHG | SYSTOLIC BLOOD PRESSURE: 130 MMHG | OXYGEN SATURATION: 97 %

## 2025-04-21 DIAGNOSIS — R50.9 FEVER, UNSPECIFIED FEVER CAUSE: ICD-10-CM

## 2025-04-21 DIAGNOSIS — R21 RASH: ICD-10-CM

## 2025-04-21 DIAGNOSIS — K04.7 DENTAL INFECTION: ICD-10-CM

## 2025-04-21 DIAGNOSIS — I10 PRIMARY HYPERTENSION: Primary | ICD-10-CM

## 2025-04-21 DIAGNOSIS — M79.89 LEG SWELLING: ICD-10-CM

## 2025-04-21 PROCEDURE — 3075F SYST BP GE 130 - 139MM HG: CPT | Performed by: NURSE PRACTITIONER

## 2025-04-21 PROCEDURE — 99214 OFFICE O/P EST MOD 30 MIN: CPT | Performed by: NURSE PRACTITIONER

## 2025-04-21 PROCEDURE — 3078F DIAST BP <80 MM HG: CPT | Performed by: NURSE PRACTITIONER

## 2025-04-21 RX ORDER — POTASSIUM CHLORIDE 750 MG/1
TABLET, EXTENDED RELEASE ORAL
Qty: 30 TABLET | Refills: 0 | Status: SHIPPED | OUTPATIENT
Start: 2025-04-21

## 2025-04-21 RX ORDER — FUROSEMIDE 40 MG/1
40 TABLET ORAL DAILY PRN
Qty: 30 TABLET | Refills: 0 | Status: SHIPPED | OUTPATIENT
Start: 2025-04-21

## 2025-04-21 RX ORDER — CHOLECALCIFEROL (VITAMIN D3) 25 MCG
1000 TABLET ORAL 2 TIMES DAILY
Qty: 180 TABLET | Refills: 1 | Status: SHIPPED | OUTPATIENT
Start: 2025-04-21

## 2025-04-21 NOTE — ASSESSMENT & PLAN NOTE
Bp controlled, but advise to switch amlodipine to arb  Give lasix prn swelling          ----- Message from Elaina Aguilar sent at 6/29/2018 10:32 AM EDT -----  Regarding: Kisha  Contact: [phone]  Pt mother advised needs dr to up the doseage for [facility], would like call back to discuss.

## 2025-04-21 NOTE — PROGRESS NOTES
Joy Whitfield (:  1985) is a 40 y.o. female,Established patient, here for evaluation of the following chief complaint(s):  Generalized Body Aches (Body aches Thursday, started feeling worse over weekend. Started running a fever over the weekend/Nobody else in the house is sick/Leg swelling)         Assessment & Plan  Primary hypertension   Bp controlled, but advise to switch amlodipine to arb  Give lasix prn swelling         Leg swelling   Improved after elevation   Rash on leg improved  Give lasix pRN         Rash   Rash on leg improved          Fever, unspecified fever cause   Declines checking UA  Lungs clear  Afebrile today  Has been on abx for dental infection, advise to f/u with dentist asap           Dental infection   Advise to see dentist asap             No follow-ups on file.       Subjective   Patient is here for symptoms of generally feeling bad. She hasn't switched from amlodipine to benicar yet.   Friday she started a low grade temp and felt achy in her body.   She had temp up to 101 last night. She has not wanted to change bp meds since she didn't feel good.   She has a rash on left lower leg - she also had worse swelling. She took some augmentin she had at home and the leg seems better. Her leg had felt hot to touch but sometimes they just feel hot to touch.   She can get angry splotches on legs after being on them a lot when she has swelling.   She also knows she has teeth flared up. she feels her \"Spleen is angry\" with her. She thinks her teeth were the cause of the fever  She is on day 3 of augmentin and feels better.           Review of Systems       Objective   Physical Exam  Vitals reviewed.   Constitutional:       Appearance: Normal appearance.   HENT:      Head: Normocephalic.      Right Ear: Tympanic membrane normal.      Left Ear: Tympanic membrane normal.      Mouth/Throat:      Mouth: Mucous membranes are moist.      Pharynx: No oropharyngeal exudate.      Comments:

## 2025-05-19 ENCOUNTER — TELEPHONE (OUTPATIENT)
Dept: INTERNAL MEDICINE CLINIC | Facility: CLINIC | Age: 40
End: 2025-05-19

## 2025-05-19 RX ORDER — POTASSIUM CHLORIDE 750 MG/1
TABLET, EXTENDED RELEASE ORAL
Qty: 30 TABLET | Refills: 0 | Status: SHIPPED | OUTPATIENT
Start: 2025-05-19

## 2025-05-19 RX ORDER — FUROSEMIDE 40 MG/1
40 TABLET ORAL DAILY PRN
Qty: 30 TABLET | Refills: 0 | Status: SHIPPED | OUTPATIENT
Start: 2025-05-19

## 2025-06-17 RX ORDER — POTASSIUM CHLORIDE 750 MG/1
TABLET, EXTENDED RELEASE ORAL
Qty: 30 TABLET | Refills: 0 | OUTPATIENT
Start: 2025-06-17

## 2025-06-17 RX ORDER — FUROSEMIDE 40 MG/1
40 TABLET ORAL DAILY PRN
Qty: 30 TABLET | Refills: 0 | OUTPATIENT
Start: 2025-06-17

## 2025-08-06 DIAGNOSIS — I10 PRIMARY HYPERTENSION: ICD-10-CM

## 2025-08-06 DIAGNOSIS — E83.52 HYPERCALCEMIA: ICD-10-CM

## 2025-08-06 DIAGNOSIS — E78.2 MIXED HYPERLIPIDEMIA: ICD-10-CM

## 2025-08-06 DIAGNOSIS — F41.9 ANXIETY: ICD-10-CM

## 2025-08-06 DIAGNOSIS — R79.89 LOW SERUM VITAMIN D: ICD-10-CM

## 2025-08-07 RX ORDER — OLMESARTAN MEDOXOMIL 20 MG/1
20 TABLET ORAL DAILY
Qty: 100 TABLET | Refills: 0 | Status: SHIPPED | OUTPATIENT
Start: 2025-08-07

## (undated) DEVICE — GARMENT,MEDLINE,DVT,INT,CALF,MED, GEN2: Brand: MEDLINE

## (undated) DEVICE — GLOVE ORANGE PI 8 1/2   MSG9085